# Patient Record
Sex: MALE | Race: BLACK OR AFRICAN AMERICAN | Employment: FULL TIME | URBAN - METROPOLITAN AREA
[De-identification: names, ages, dates, MRNs, and addresses within clinical notes are randomized per-mention and may not be internally consistent; named-entity substitution may affect disease eponyms.]

---

## 2017-04-29 ENCOUNTER — HOSPITAL ENCOUNTER (INPATIENT)
Age: 42
LOS: 5 days | Discharge: HOME | DRG: 392 | End: 2017-05-04
Attending: EMERGENCY MEDICINE | Admitting: INTERNAL MEDICINE
Payer: COMMERCIAL

## 2017-04-29 ENCOUNTER — APPOINTMENT (OUTPATIENT)
Dept: CT IMAGING | Age: 42
DRG: 392 | End: 2017-04-29
Attending: EMERGENCY MEDICINE
Payer: COMMERCIAL

## 2017-04-29 DIAGNOSIS — K52.9 ENTEROCOLITIS: Primary | ICD-10-CM

## 2017-04-29 LAB
ALBUMIN SERPL BCP-MCNC: 2.4 G/DL (ref 3.5–5)
ALBUMIN/GLOB SERPL: 0.5 {RATIO} (ref 1.1–2.2)
ALP SERPL-CCNC: 38 U/L (ref 45–117)
ALT SERPL-CCNC: 10 U/L (ref 12–78)
ANION GAP BLD CALC-SCNC: 8 MMOL/L (ref 5–15)
APPEARANCE UR: CLEAR
AST SERPL W P-5'-P-CCNC: 5 U/L (ref 15–37)
BACTERIA URNS QL MICRO: NEGATIVE /HPF
BASOPHILS # BLD AUTO: 0 K/UL (ref 0–0.1)
BASOPHILS # BLD: 0 % (ref 0–1)
BILIRUB SERPL-MCNC: 0.3 MG/DL (ref 0.2–1)
BILIRUB UR QL CFM: NEGATIVE
BUN SERPL-MCNC: 5 MG/DL (ref 6–20)
BUN/CREAT SERPL: 7 (ref 12–20)
CALCIUM SERPL-MCNC: 8.6 MG/DL (ref 8.5–10.1)
CHLORIDE SERPL-SCNC: 97 MMOL/L (ref 97–108)
CO2 SERPL-SCNC: 29 MMOL/L (ref 21–32)
COLOR UR: ABNORMAL
CREAT SERPL-MCNC: 0.71 MG/DL (ref 0.7–1.3)
CRP SERPL-MCNC: 13.9 MG/DL (ref 0–0.6)
DIFFERENTIAL METHOD BLD: ABNORMAL
EOSINOPHIL # BLD: 0 K/UL (ref 0–0.4)
EOSINOPHIL NFR BLD: 0 % (ref 0–7)
EPITH CASTS URNS QL MICRO: ABNORMAL /LPF
ERYTHROCYTE [DISTWIDTH] IN BLOOD BY AUTOMATED COUNT: 14.9 % (ref 11.5–14.5)
GLOBULIN SER CALC-MCNC: 5.1 G/DL (ref 2–4)
GLUCOSE SERPL-MCNC: 97 MG/DL (ref 65–100)
GLUCOSE UR STRIP.AUTO-MCNC: NEGATIVE MG/DL
HCT VFR BLD AUTO: 31.7 % (ref 36.6–50.3)
HGB BLD-MCNC: 10.6 G/DL (ref 12.1–17)
HGB UR QL STRIP: NEGATIVE
KETONES UR QL STRIP.AUTO: 40 MG/DL
LACTATE SERPL-SCNC: 0.7 MMOL/L (ref 0.4–2)
LEUKOCYTE ESTERASE UR QL STRIP.AUTO: NEGATIVE
LIPASE SERPL-CCNC: 47 U/L (ref 73–393)
LYMPHOCYTES # BLD AUTO: 20 % (ref 12–49)
LYMPHOCYTES # BLD: 1.7 K/UL (ref 0.8–3.5)
MCH RBC QN AUTO: 22.5 PG (ref 26–34)
MCHC RBC AUTO-ENTMCNC: 33.4 G/DL (ref 30–36.5)
MCV RBC AUTO: 67.2 FL (ref 80–99)
MONOCYTES # BLD: 1 K/UL (ref 0–1)
MONOCYTES NFR BLD AUTO: 12 % (ref 5–13)
NEUTS SEG # BLD: 5.6 K/UL (ref 1.8–8)
NEUTS SEG NFR BLD AUTO: 68 % (ref 32–75)
NITRITE UR QL STRIP.AUTO: NEGATIVE
PH UR STRIP: 6 [PH] (ref 5–8)
PLATELET # BLD AUTO: 878 K/UL (ref 150–400)
POTASSIUM SERPL-SCNC: 3.4 MMOL/L (ref 3.5–5.1)
PROT SERPL-MCNC: 7.5 G/DL (ref 6.4–8.2)
PROT UR STRIP-MCNC: 100 MG/DL
RBC # BLD AUTO: 4.72 M/UL (ref 4.1–5.7)
RBC #/AREA URNS HPF: ABNORMAL /HPF (ref 0–5)
RBC MORPH BLD: ABNORMAL
SODIUM SERPL-SCNC: 134 MMOL/L (ref 136–145)
SP GR UR REFRACTOMETRY: 1.03 (ref 1–1.03)
UA: UC IF INDICATED,UAUC: ABNORMAL
UROBILINOGEN UR QL STRIP.AUTO: 0.2 EU/DL (ref 0.2–1)
WBC # BLD AUTO: 8.3 K/UL (ref 4.1–11.1)
WBC URNS QL MICRO: ABNORMAL /HPF (ref 0–4)

## 2017-04-29 PROCEDURE — 87045 FECES CULTURE AEROBIC BACT: CPT | Performed by: INTERNAL MEDICINE

## 2017-04-29 PROCEDURE — 83690 ASSAY OF LIPASE: CPT | Performed by: EMERGENCY MEDICINE

## 2017-04-29 PROCEDURE — 74177 CT ABD & PELVIS W/CONTRAST: CPT

## 2017-04-29 PROCEDURE — 87086 URINE CULTURE/COLONY COUNT: CPT | Performed by: EMERGENCY MEDICINE

## 2017-04-29 PROCEDURE — 36415 COLL VENOUS BLD VENIPUNCTURE: CPT | Performed by: EMERGENCY MEDICINE

## 2017-04-29 PROCEDURE — 85025 COMPLETE CBC W/AUTO DIFF WBC: CPT | Performed by: EMERGENCY MEDICINE

## 2017-04-29 PROCEDURE — 87493 C DIFF AMPLIFIED PROBE: CPT | Performed by: INTERNAL MEDICINE

## 2017-04-29 PROCEDURE — 99284 EMERGENCY DEPT VISIT MOD MDM: CPT

## 2017-04-29 PROCEDURE — 96375 TX/PRO/DX INJ NEW DRUG ADDON: CPT

## 2017-04-29 PROCEDURE — 89055 LEUKOCYTE ASSESSMENT FECAL: CPT | Performed by: INTERNAL MEDICINE

## 2017-04-29 PROCEDURE — 83605 ASSAY OF LACTIC ACID: CPT | Performed by: EMERGENCY MEDICINE

## 2017-04-29 PROCEDURE — 96374 THER/PROPH/DIAG INJ IV PUSH: CPT

## 2017-04-29 PROCEDURE — 96376 TX/PRO/DX INJ SAME DRUG ADON: CPT

## 2017-04-29 PROCEDURE — 86140 C-REACTIVE PROTEIN: CPT | Performed by: EMERGENCY MEDICINE

## 2017-04-29 PROCEDURE — 81001 URINALYSIS AUTO W/SCOPE: CPT | Performed by: EMERGENCY MEDICINE

## 2017-04-29 PROCEDURE — 80053 COMPREHEN METABOLIC PANEL: CPT | Performed by: EMERGENCY MEDICINE

## 2017-04-29 PROCEDURE — 65270000029 HC RM PRIVATE

## 2017-04-29 PROCEDURE — 74011250636 HC RX REV CODE- 250/636: Performed by: EMERGENCY MEDICINE

## 2017-04-29 PROCEDURE — 74011000258 HC RX REV CODE- 258: Performed by: EMERGENCY MEDICINE

## 2017-04-29 PROCEDURE — 74011636320 HC RX REV CODE- 636/320: Performed by: EMERGENCY MEDICINE

## 2017-04-29 PROCEDURE — 74011250636 HC RX REV CODE- 250/636: Performed by: INTERNAL MEDICINE

## 2017-04-29 PROCEDURE — 74011250636 HC RX REV CODE- 250/636: Performed by: NURSE PRACTITIONER

## 2017-04-29 PROCEDURE — 74011000250 HC RX REV CODE- 250: Performed by: EMERGENCY MEDICINE

## 2017-04-29 RX ORDER — POTASSIUM CHLORIDE AND SODIUM CHLORIDE 900; 300 MG/100ML; MG/100ML
INJECTION, SOLUTION INTRAVENOUS CONTINUOUS
Status: DISCONTINUED | OUTPATIENT
Start: 2017-04-29 | End: 2017-04-30

## 2017-04-29 RX ORDER — ONDANSETRON 2 MG/ML
4 INJECTION INTRAMUSCULAR; INTRAVENOUS
Status: COMPLETED | OUTPATIENT
Start: 2017-04-29 | End: 2017-04-29

## 2017-04-29 RX ORDER — SODIUM CHLORIDE 9 MG/ML
150 INJECTION, SOLUTION INTRAVENOUS CONTINUOUS
Status: DISCONTINUED | OUTPATIENT
Start: 2017-04-29 | End: 2017-04-29

## 2017-04-29 RX ORDER — HYDROMORPHONE HYDROCHLORIDE 1 MG/ML
1 INJECTION, SOLUTION INTRAMUSCULAR; INTRAVENOUS; SUBCUTANEOUS
Status: DISCONTINUED | OUTPATIENT
Start: 2017-04-29 | End: 2017-04-30

## 2017-04-29 RX ORDER — METRONIDAZOLE 500 MG/100ML
500 INJECTION, SOLUTION INTRAVENOUS
Status: COMPLETED | OUTPATIENT
Start: 2017-04-29 | End: 2017-04-29

## 2017-04-29 RX ORDER — ACETAMINOPHEN 325 MG/1
650 TABLET ORAL
Status: DISCONTINUED | OUTPATIENT
Start: 2017-04-29 | End: 2017-05-04 | Stop reason: HOSPADM

## 2017-04-29 RX ORDER — METRONIDAZOLE 500 MG/1
500 TABLET ORAL 3 TIMES DAILY
COMMUNITY
End: 2017-05-04

## 2017-04-29 RX ORDER — ONDANSETRON 2 MG/ML
4 INJECTION INTRAMUSCULAR; INTRAVENOUS
Status: DISCONTINUED | OUTPATIENT
Start: 2017-04-29 | End: 2017-05-04 | Stop reason: HOSPADM

## 2017-04-29 RX ORDER — SODIUM CHLORIDE 0.9 % (FLUSH) 0.9 %
5-10 SYRINGE (ML) INJECTION EVERY 8 HOURS
Status: DISCONTINUED | OUTPATIENT
Start: 2017-04-29 | End: 2017-05-04 | Stop reason: HOSPADM

## 2017-04-29 RX ORDER — SODIUM CHLORIDE 0.9 % (FLUSH) 0.9 %
10 SYRINGE (ML) INJECTION
Status: COMPLETED | OUTPATIENT
Start: 2017-04-29 | End: 2017-04-29

## 2017-04-29 RX ORDER — HYDROMORPHONE HYDROCHLORIDE 1 MG/ML
1 INJECTION, SOLUTION INTRAMUSCULAR; INTRAVENOUS; SUBCUTANEOUS
Status: COMPLETED | OUTPATIENT
Start: 2017-04-29 | End: 2017-04-29

## 2017-04-29 RX ORDER — SODIUM CHLORIDE 0.9 % (FLUSH) 0.9 %
5-10 SYRINGE (ML) INJECTION AS NEEDED
Status: DISCONTINUED | OUTPATIENT
Start: 2017-04-29 | End: 2017-05-04 | Stop reason: HOSPADM

## 2017-04-29 RX ORDER — LEVOFLOXACIN 5 MG/ML
500 INJECTION, SOLUTION INTRAVENOUS EVERY 24 HOURS
Status: DISCONTINUED | OUTPATIENT
Start: 2017-04-29 | End: 2017-05-04

## 2017-04-29 RX ORDER — ENOXAPARIN SODIUM 100 MG/ML
40 INJECTION SUBCUTANEOUS EVERY 24 HOURS
Status: DISCONTINUED | OUTPATIENT
Start: 2017-04-29 | End: 2017-05-01

## 2017-04-29 RX ORDER — CIPROFLOXACIN 500 MG/1
500 TABLET ORAL 2 TIMES DAILY
COMMUNITY
End: 2017-05-04

## 2017-04-29 RX ORDER — METRONIDAZOLE 500 MG/100ML
500 INJECTION, SOLUTION INTRAVENOUS EVERY 8 HOURS
Status: DISCONTINUED | OUTPATIENT
Start: 2017-04-30 | End: 2017-05-04

## 2017-04-29 RX ORDER — LEVOFLOXACIN 5 MG/ML
500 INJECTION, SOLUTION INTRAVENOUS
Status: DISCONTINUED | OUTPATIENT
Start: 2017-04-29 | End: 2017-04-29 | Stop reason: SDUPTHER

## 2017-04-29 RX ADMIN — LEVOFLOXACIN 500 MG: 5 INJECTION, SOLUTION INTRAVENOUS at 19:24

## 2017-04-29 RX ADMIN — METRONIDAZOLE 500 MG: 500 INJECTION, SOLUTION INTRAVENOUS at 18:15

## 2017-04-29 RX ADMIN — ONDANSETRON 4 MG: 2 INJECTION INTRAMUSCULAR; INTRAVENOUS at 23:48

## 2017-04-29 RX ADMIN — HYDROMORPHONE HYDROCHLORIDE 1 MG: 1 INJECTION, SOLUTION INTRAMUSCULAR; INTRAVENOUS; SUBCUTANEOUS at 23:48

## 2017-04-29 RX ADMIN — SODIUM CHLORIDE AND POTASSIUM CHLORIDE: 9; 2.98 INJECTION, SOLUTION INTRAVENOUS at 19:57

## 2017-04-29 RX ADMIN — HYDROMORPHONE HYDROCHLORIDE 1 MG: 1 INJECTION, SOLUTION INTRAMUSCULAR; INTRAVENOUS; SUBCUTANEOUS at 17:22

## 2017-04-29 RX ADMIN — HYDROMORPHONE HYDROCHLORIDE 1 MG: 1 INJECTION, SOLUTION INTRAMUSCULAR; INTRAVENOUS; SUBCUTANEOUS at 19:25

## 2017-04-29 RX ADMIN — HYDROMORPHONE HYDROCHLORIDE 1 MG: 1 INJECTION, SOLUTION INTRAMUSCULAR; INTRAVENOUS; SUBCUTANEOUS at 16:12

## 2017-04-29 RX ADMIN — Medication 10 ML: at 17:10

## 2017-04-29 RX ADMIN — SODIUM CHLORIDE 100 ML: 900 INJECTION, SOLUTION INTRAVENOUS at 17:10

## 2017-04-29 RX ADMIN — SODIUM CHLORIDE 1000 ML: 900 INJECTION, SOLUTION INTRAVENOUS at 16:12

## 2017-04-29 RX ADMIN — ENOXAPARIN SODIUM 40 MG: 40 INJECTION SUBCUTANEOUS at 19:57

## 2017-04-29 RX ADMIN — IOPAMIDOL 100 ML: 755 INJECTION, SOLUTION INTRAVENOUS at 17:10

## 2017-04-29 RX ADMIN — ONDANSETRON 4 MG: 2 INJECTION INTRAMUSCULAR; INTRAVENOUS at 16:12

## 2017-04-29 NOTE — H&P
H and P dictated       40year old with abdominal pain and diarrhea and picture of enetrocolitis   Talked to Radiologist Dr Portia Maria , and according to him no SBO or perf seen but has sever dilatation of the bowels and thin colon walls at risk of perforation . Have discussed with Dr Valentino Jefferson and also put in for [facility] consult . started antibiotics and fluids for now /stool studies   I would wait to do any paracentesis yet for ascitis but would need it eventually   Patient adamant on eating food , but started on clears .

## 2017-04-29 NOTE — ROUTINE PROCESS
TRANSFER - IN REPORT:    Verbal report received from [facility]) on Rose Streeter  being received from ED(unit) for routine progression of care      Report consisted of patients Situation, Background, Assessment and   Recommendations(SBAR). Information from the following report(s) SBAR was reviewed with the receiving nurse. Opportunity for questions and clarification was provided. Assessment completed upon patients arrival to unit and care assumed.

## 2017-04-29 NOTE — ROUTINE PROCESS
TRANSFER - OUT REPORT:    Verbal report given to RN (name) on Myrtie Kayser  being transferred to 501 (unit) for routine progression of care       Report consisted of patients Situation, Background, Assessment and   Recommendations(SBAR). Information from the following report(s) SBAR, ED Summary, [facility] and Recent Results was reviewed with the receiving nurse. Lines:   Peripheral IV 04/29/17 Right Antecubital (Active)   Site Assessment Clean, dry, & intact 4/29/2017  4:01 PM   Phlebitis Assessment 0 4/29/2017  4:01 PM   Infiltration Assessment 0 4/29/2017  4:01 PM   Dressing Status Clean, dry, & intact 4/29/2017  4:01 PM   Hub Color/Line Status Pink 4/29/2017  4:01 PM        Opportunity for questions and clarification was provided.       Patient transported with:

## 2017-04-29 NOTE — ROUTINE PROCESS
Bedside and Verbal shift change report given to Shahnaz Jacques (oncoming nurse) by Reta Kang (offgoing nurse). Report included the following information SBAR, Kardex, ED Summary, Intake/Output, MAR, Accordion and Recent Results. All opportunity for clarification/questions given.

## 2017-04-29 NOTE — ED TRIAGE NOTES
Pt presents with mid to lower abd pain that has been going on for the past 6 weeks. Pt was recently in the hospital for same symptoms and was given IV antibiotics. Pt was then discharge on Flagyl and Cipro PO. Pt states he took his last dosages of antibiotics this past Thursday. Pt states his pain has increased and he has not been eating well. Pt cont to have diarrhea but denies vomiting.
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
## 2017-04-29 NOTE — IP AVS SNAPSHOT
[address] 
[phone] Patient: Tami Méndez MRN: [id number] CIU:8/90/3690 You are allergic to the following No active allergies Recent Documentation Height Weight BMI Smoking Status 1.829 m 68.6 kg 20.51 kg/m2 Current Every Day Smoker Emergency Contacts Name Discharge Info Relation Home Work Mobile Joss Caballero CAREGIVER [3] Friend [5] [phone] About your hospitalization You were admitted on:  April 29, 2017 You last received care in the:  [place] [id number] You were discharged on: May 4, 2017 Unit phone number:  [phone] Why you were hospitalized Your primary diagnosis was:  Not on File Your diagnoses also included:  Enterocolitis Providers Seen During Your Hospitalizations Provider Role Specialty Primary office phone Antony Barclay MD Attending Provider Emergency Medicine [phone] Zo Reeder MD Attending Provider Internal Medicine [phone] Mariangel Ayon DO Attending Provider Internal Medicine [phone] Nadja Guerra DO Attending Provider Internal Medicine [phone] Your Primary Care Physician (PCP) Primary Care Physician Office Phone Office Fax NONE ** None ** ** None ** Follow-up Information Follow up With Details Comments Contact Info None   None ([phone]) Patient stated that they have no PCP Destinee Brasher MD In 2 weeks  [address] [place] [address] [address] [place] [address] 
[phone] Current Discharge Medication List  
  
START taking these medications Dose & Instructions Dispensing Information Comments Morning Noon Evening Bedtime  
 mesalamine 500 mg CR capsule Commonly known as:  PENTASA Your last dose was:     
   
Your next dose is:    
   
   
 Dose:  1000 mg  
 Take 2 Caps by mouth three (3) times daily. Indications: CROHN'S DISEASE Quantity:  90 Cap Refills:  2  
     
   
   
   
  
 predniSONE 10 mg tablet Commonly known as:  Shanda Mueller Your last dose was: Your next dose is:    
   
   
 Dose:  60 mg Take 6 Tabs by mouth daily (with breakfast) for 7 days. Then 5 tablets daily for 7 days, then 4 tablets daily for 7 days, then 3 tablets daily for 7 days, then 2 tablets daily for 7 days, then 1 tablet daily for 7 days, then STOP Quantity:  147 Tab Refills:  0 STOP taking these medications CIPRO 500 mg tablet Generic drug:  ciprofloxacin HCl  
   
  
 metroNIDAZOLE 500 mg tablet Commonly known as:  FLAGYL Where to Get Your Medications Information on where to get these meds will be given to you by the nurse or doctor. ! Ask your nurse or doctor about these medications  
  mesalamine 500 mg CR capsule  
 predniSONE 10 mg tablet Discharge Instructions Colitis: Care Instructions Your Care Instructions Colitis is the medical term for swelling (inflammation) of the intestine. It can be caused by different things, such as an infection or loss of blood flow in the intestine. Other causes are problems like Crohn's disease or ulcerative colitis. Symptoms may include fever, diarrhea that may be bloody, or belly pain. Sometimes symptoms go away without treatment. But you may need treatment or more tests, such as blood tests or a stool test. Or you may need imaging tests like a CT scan or a colonoscopy. In some cases, the doctor may want to test a sample of tissue from the intestine. This test is called a biopsy. The doctor has checked you carefully, but problems can develop later. If you notice any problems or new symptoms, get medical treatment right away. Follow-up care is a key part of your treatment and safety.  Be sure to make and go to all appointments, and call your doctor if you are having problems. It's also a good idea to know your test results and keep a list of the medicines you take. How can you care for yourself at home? · Rest until you feel better. · Your doctor may recommend that you eat bland foods. These include rice, dry toast or crackers, bananas, and applesauce. · To prevent dehydration, drink plenty of fluids. Choose water and other caffeine-free clear liquids until you feel better. If you have kidney, heart, or liver disease and have to limit fluids, talk with your doctor before you increase the amount of fluids you drink. · Be safe with medicines. Take your medicines exactly as prescribed. Call your doctor if you think you are having a problem with your medicine. You will get more details on the specific medicines your doctor prescribes. When should you call for help? Call 911 anytime you think you may need emergency care. For example, call if: 
· You passed out (lost consciousness). · You vomit blood or what looks like coffee grounds. · Your stools are maroon or very bloody. Call your doctor now or seek immediate medical care if: 
· You have new or worse pain. · You have a new or higher fever. · You have new or worse symptoms. · You cannot keep fluids or medicines down. Watch closely for changes in your health, and be sure to contact your doctor if: 
· You do not get better as expected. Where can you learn more? Go to http://luis-timbo.info/. Kelly Cruz in the search box to learn more about \"Colitis: Care Instructions. \" Current as of: August 9, 2016 Content Version: 11.2 © 5359-6329 [employer]. Care instructions adapted under license by [employer] (which disclaims liability or warranty for this information).  If you have questions about a medical condition or this instruction, always ask your healthcare professional. [employer] disclaims any warranty or liability for your use of this information. Discharge Orders None Introducing [facility]! Pollo Roth introduces [facility] patient portal. Now you can access parts of your medical record, email your doctor's office, and request medication refills online. 1. In your internet browser, go to [email or web address] 2. Click on the First Time User? Click Here link in the Sign In box. You will see the New Member Sign Up page. 3. Enter your [facility] Access Code exactly as it appears below. You will not need to use this code after youve completed the sign-up process. If you do not sign up before the expiration date, you must request a new code. · [facility] Access Code: [email or web address] Expires: 7/28/2017  4:17 PM 
 
4. Enter the last four digits of your Social Security Number (xxxx) and Date of Birth (mm/dd/yyyy) as indicated and click Submit. You will be taken to the next sign-up page. 5. Create a [facility] ID. This will be your [facility] login ID and cannot be changed, so think of one that is secure and easy to remember. 6. Create a [facility] password. You can change your password at any time. 7. Enter your Password Reset Question and Answer. This can be used at a later time if you forget your password. 8. Enter your e-mail address. You will receive e-mail notification when new information is available in [address] 9. Click Sign Up. You can now view and download portions of your medical record. 10. Click the Download Summary menu link to download a portable copy of your medical information. If you have questions, please visit the Frequently Asked Questions section of the [facility] website. Remember, [facility] is NOT to be used for urgent needs. For medical emergencies, dial 911. Now available from your iPhone and Android! General Information Please provide this summary of care documentation to your next provider. Patient Signature:  ____________________________________________________________ Date:  ____________________________________________________________  
  
Mitzi Parisian Provider Signature:  ____________________________________________________________ Date:  ____________________________________________________________

## 2017-04-29 NOTE — ED NOTES
Shift change report given to [facility] (oncoming nurse) by Laura Covarrubias RN (offgoing nurse). Report included the following information SBAR, ED Summary, MAR and Recent Results.

## 2017-04-30 ENCOUNTER — APPOINTMENT (OUTPATIENT)
Dept: GENERAL RADIOLOGY | Age: 42
DRG: 392 | End: 2017-04-30
Attending: INTERNAL MEDICINE
Payer: COMMERCIAL

## 2017-04-30 LAB
ALBUMIN SERPL BCP-MCNC: 2.1 G/DL (ref 3.5–5)
ALBUMIN/GLOB SERPL: 0.5 {RATIO} (ref 1.1–2.2)
ALP SERPL-CCNC: 34 U/L (ref 45–117)
ALT SERPL-CCNC: 8 U/L (ref 12–78)
ANION GAP BLD CALC-SCNC: 8 MMOL/L (ref 5–15)
AST SERPL W P-5'-P-CCNC: 3 U/L (ref 15–37)
BILIRUB SERPL-MCNC: 0.2 MG/DL (ref 0.2–1)
BUN SERPL-MCNC: 5 MG/DL (ref 6–20)
BUN/CREAT SERPL: 8 (ref 12–20)
CALCIUM SERPL-MCNC: 8.3 MG/DL (ref 8.5–10.1)
CHLORIDE SERPL-SCNC: 99 MMOL/L (ref 97–108)
CO2 SERPL-SCNC: 28 MMOL/L (ref 21–32)
CREAT SERPL-MCNC: 0.6 MG/DL (ref 0.7–1.3)
GLOBULIN SER CALC-MCNC: 4.6 G/DL (ref 2–4)
GLUCOSE SERPL-MCNC: 111 MG/DL (ref 65–100)
POTASSIUM SERPL-SCNC: 4 MMOL/L (ref 3.5–5.1)
PROT SERPL-MCNC: 6.7 G/DL (ref 6.4–8.2)
SODIUM SERPL-SCNC: 135 MMOL/L (ref 136–145)

## 2017-04-30 PROCEDURE — 74022 RADEX COMPL AQT ABD SERIES: CPT

## 2017-04-30 PROCEDURE — 74011000250 HC RX REV CODE- 250: Performed by: INTERNAL MEDICINE

## 2017-04-30 PROCEDURE — 74011250636 HC RX REV CODE- 250/636: Performed by: INTERNAL MEDICINE

## 2017-04-30 PROCEDURE — 80053 COMPREHEN METABOLIC PANEL: CPT | Performed by: INTERNAL MEDICINE

## 2017-04-30 PROCEDURE — 65270000029 HC RM PRIVATE

## 2017-04-30 PROCEDURE — 36415 COLL VENOUS BLD VENIPUNCTURE: CPT | Performed by: INTERNAL MEDICINE

## 2017-04-30 RX ORDER — HYDROMORPHONE HYDROCHLORIDE 1 MG/ML
1 INJECTION, SOLUTION INTRAMUSCULAR; INTRAVENOUS; SUBCUTANEOUS
Status: DISCONTINUED | OUTPATIENT
Start: 2017-04-30 | End: 2017-05-01

## 2017-04-30 RX ORDER — MORPHINE SULFATE 2 MG/ML
1 INJECTION, SOLUTION INTRAMUSCULAR; INTRAVENOUS
Status: DISCONTINUED | OUTPATIENT
Start: 2017-04-30 | End: 2017-04-30

## 2017-04-30 RX ORDER — HYDROMORPHONE HYDROCHLORIDE 1 MG/ML
1 INJECTION, SOLUTION INTRAMUSCULAR; INTRAVENOUS; SUBCUTANEOUS ONCE
Status: COMPLETED | OUTPATIENT
Start: 2017-04-30 | End: 2017-04-30

## 2017-04-30 RX ORDER — HYDROMORPHONE HYDROCHLORIDE 1 MG/ML
1 INJECTION, SOLUTION INTRAMUSCULAR; INTRAVENOUS; SUBCUTANEOUS
Status: DISCONTINUED | OUTPATIENT
Start: 2017-04-30 | End: 2017-04-30

## 2017-04-30 RX ORDER — MORPHINE SULFATE 2 MG/ML
2 INJECTION, SOLUTION INTRAMUSCULAR; INTRAVENOUS
Status: DISCONTINUED | OUTPATIENT
Start: 2017-04-30 | End: 2017-04-30

## 2017-04-30 RX ORDER — SODIUM CHLORIDE 9 MG/ML
75 INJECTION, SOLUTION INTRAVENOUS CONTINUOUS
Status: DISCONTINUED | OUTPATIENT
Start: 2017-04-30 | End: 2017-05-04 | Stop reason: HOSPADM

## 2017-04-30 RX ORDER — HYDROCODONE BITARTRATE AND ACETAMINOPHEN 5; 325 MG/1; MG/1
1 TABLET ORAL
Status: DISCONTINUED | OUTPATIENT
Start: 2017-04-30 | End: 2017-05-04 | Stop reason: HOSPADM

## 2017-04-30 RX ADMIN — LEVOFLOXACIN 500 MG: 5 INJECTION, SOLUTION INTRAVENOUS at 19:37

## 2017-04-30 RX ADMIN — SODIUM CHLORIDE 125 ML/HR: 900 INJECTION, SOLUTION INTRAVENOUS at 18:40

## 2017-04-30 RX ADMIN — HYDROMORPHONE HYDROCHLORIDE 1 MG: 1 INJECTION, SOLUTION INTRAMUSCULAR; INTRAVENOUS; SUBCUTANEOUS at 22:06

## 2017-04-30 RX ADMIN — METRONIDAZOLE 500 MG: 500 INJECTION, SOLUTION INTRAVENOUS at 18:40

## 2017-04-30 RX ADMIN — Medication 1 MG: at 16:17

## 2017-04-30 RX ADMIN — Medication 10 ML: at 22:16

## 2017-04-30 RX ADMIN — HYDROMORPHONE HYDROCHLORIDE 1 MG: 1 INJECTION, SOLUTION INTRAMUSCULAR; INTRAVENOUS; SUBCUTANEOUS at 04:18

## 2017-04-30 RX ADMIN — POLYETHYLENE GLYCOL 3350, SODIUM SULFATE ANHYDROUS, SODIUM BICARBONATE, SODIUM CHLORIDE, POTASSIUM CHLORIDE 4000 ML: 236; 22.74; 6.74; 5.86; 2.97 POWDER, FOR SOLUTION ORAL at 16:50

## 2017-04-30 RX ADMIN — METRONIDAZOLE 500 MG: 500 INJECTION, SOLUTION INTRAVENOUS at 02:09

## 2017-04-30 RX ADMIN — HYDROMORPHONE HYDROCHLORIDE 1 MG: 1 INJECTION, SOLUTION INTRAMUSCULAR; INTRAVENOUS; SUBCUTANEOUS at 08:45

## 2017-04-30 RX ADMIN — SODIUM CHLORIDE AND POTASSIUM CHLORIDE: 9; 2.98 INJECTION, SOLUTION INTRAVENOUS at 08:48

## 2017-04-30 RX ADMIN — HYDROMORPHONE HYDROCHLORIDE 1 MG: 1 INJECTION, SOLUTION INTRAMUSCULAR; INTRAVENOUS; SUBCUTANEOUS at 19:42

## 2017-04-30 RX ADMIN — METRONIDAZOLE 500 MG: 500 INJECTION, SOLUTION INTRAVENOUS at 10:23

## 2017-04-30 NOTE — PROGRESS NOTES
Pt got dose of morphine iv at 1617 pm, now wants pain med changed back to dilaudid, page out to Dr Amadou Mendoza.

## 2017-04-30 NOTE — PROGRESS NOTES
Hospitalist Progress Note  Mellissa Rodriguez [place]  Office: [phone]        Date of Service:  2017  NAME:  Stevo Sotomayor  :  1975  MRN:  [id number]      Admission Summary:   41 y/o male admitted for abdominal pain. CT abdomen/pelvis with infectious vs inflammatory colitis. Interval history / Subjective:   Positive for vomiting this AM.  No prior vomiting until arrival to the hospital.  Positive abdominal pain. No BM yet today. Assessment & Plan:     41 y/o male with:  1. Acute colitis  --Infectious vs inflammatory  --Continue levoquin/flagyl  --Appreciate GI consult-->colonoscopy in AM  --Appreciate surgery consult-->no current indication for surgical intervention    2. Nausea with vomiting  --Acute abdominal series pending to eval for obstruction  --If positive, will need NG to suction and surgical re-evaluation  --Zofran and compazine PRN    3. Mild hyponatremia  --IVF    4. Hypokalemia  --Resolved     Code status: FULL  DVT prophylaxis: Lovenox    Care Plan discussed with: patient and nurse  Disposition: TBD     Hospital Problems  Never Reviewed          Codes Class Noted POA    Enterocolitis ICD-10-CM: K52.9  ICD-9-CM: 558.9  2017 Unknown                Review of Systems:   See above      Vital Signs:    Last 24hrs VS reviewed since prior progress note.  Most recent are:  Visit Vitals    /84 (BP 1 Location: Left arm, BP Patient Position: At rest;Head of bed elevated (Comment degrees))    Pulse 72    Temp 98.4 °F (36.9 °C)    Resp 14    Ht 6' (1.829 m)    SpO2 97%         Intake/Output Summary (Last 24 hours) at 17 1454  Last data filed at 17 0313   Gross per 24 hour   Intake                0 ml   Output             1650 ml   Net            -1650 ml        Physical Examination:             Constitutional:  Mild acute distress--actively vomiting, cooperative, pleasant    ENT:  Oral mucous moist, oropharynx benign. Neck supple,    Resp:  CTA bilaterally. No wheezing/rhonchi/rales. No accessory muscle use   CV:  Regular rhythm, normal rate, no murmurs, gallops, rubs    GI:  Firm and distended, non tender. Hypo=oactive bowel sounds, no hepatosplenomegaly     Musculoskeletal:  No edema, warm, 2+ pulses throughout    Neurologic:  Moves all extremities. AAOx3, CN II-XII reviewed           Labs:     Recent Labs      04/29/17   1602   WBC  8.3   HGB  10.6*   HCT  31.7*   PLT  878*     Recent Labs      04/30/17   0406  04/29/17   1602   NA  135*  134*   K  4.0  3.4*   CL  99  97   CO2  28  29   BUN  5*  5*   CREA  0.60*  0.71   GLU  111*  97   CA  8.3*  8.6     Recent Labs      04/30/17   0406  04/29/17   1602   SGOT  3*  5*   ALT  8*  10*   AP  34*  38*   TBILI  0.2  0.3   TP  6.7  7.5   ALB  2.1*  2.4*   GLOB  4.6*  5.1*   LPSE   --   47*     No results for input(s): INR, PTP, APTT in the last 72 hours. No lab exists for component: INREXT   No results for input(s): FE, TIBC, PSAT, FERR in the last 72 hours. No results found for: FOL, RBCF   No results for input(s): PH, PCO2, PO2 in the last 72 hours. No results for input(s): CPK, CKNDX, TROIQ in the last 72 hours.     No lab exists for component: CPKMB  No results found for: CHOL, CHOLX, CHLST, CHOLV, HDL, LDL, DLDL, LDLC, DLDLP, TGL, TGLX, TRIGL, TRIGP, CHHD, CHHDX  No results found for: [facility]  Lab Results   Component Value Date/Time    Color DARK YELLOW 04/29/2017 04:15 PM    Appearance CLEAR 04/29/2017 04:15 PM    Specific gravity 1.027 04/29/2017 04:15 PM    pH (UA) 6.0 04/29/2017 04:15 PM    Protein 100 04/29/2017 04:15 PM    Glucose NEGATIVE  04/29/2017 04:15 PM    Ketone 40 04/29/2017 04:15 PM    Urobilinogen 0.2 04/29/2017 04:15 PM    Nitrites NEGATIVE  04/29/2017 04:15 PM    Leukocyte Esterase NEGATIVE  04/29/2017 04:15 PM    Epithelial cells FEW 04/29/2017 04:15 PM    Bacteria NEGATIVE  04/29/2017 04:15 PM    WBC 10-20 04/29/2017 04:15 PM RBC 0-5 04/29/2017 04:15 PM         Medications Reviewed:     Current Facility-Administered Medications   Medication Dose Route Frequency    HYDROcodone-acetaminophen (NORCO) 5-325 mg per tablet 1 Tab  1 Tab Oral Q6H PRN    peg 3350-electrolytes (COLYTE) 4000 mL  4,000 mL Oral ONCE    morphine injection 1 mg  1 mg IntraVENous Q4H PRN    prochlorperazine (COMPAZINE) with saline injection 5 mg  5 mg IntraVENous Q8H PRN    metroNIDAZOLE (FLAGYL) IVPB premix 500 mg  500 mg IntraVENous Q8H    levoFLOXacin (LEVAQUIN) 500 mg in D5W IVPB  500 mg IntraVENous Q24H    sodium chloride (NS) flush 5-10 mL  5-10 mL IntraVENous Q8H    sodium chloride (NS) flush 5-10 mL  5-10 mL IntraVENous PRN    acetaminophen (TYLENOL) tablet 650 mg  650 mg Oral Q4H PRN    enoxaparin (LOVENOX) injection 40 mg  40 mg SubCUTAneous Q24H    0.9% sodium chloride with KCl 40 mEq/L infusion   IntraVENous CONTINUOUS    ondansetron (ZOFRAN) injection 4 mg  4 mg IntraVENous Q4H PRN                    Mellissa Rodriguez DO

## 2017-04-30 NOTE — ROUTINE PROCESS
Bedside shift change report given to ROSEANNE Christie (oncoming nurse) by [facility], RN (offgoing nurse). Report included the following information SBAR.

## 2017-04-30 NOTE — PROGRESS NOTES
Pt with many complaints about having to drink golytely for bowel prep,. Instructed pt on drinking golytely, time frame to complete it, nothing to eat or drink after midnight, etc. Pt states he \"will do what he can do\". Liquid stool specimen sent to lab for C.diff, culture and WBC.
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
## 2017-04-30 NOTE — PROGRESS NOTES
[facility] returned page. Notified MD patient is unhappy with pain medication change. Md gave telephone read back order for Morphine 2mg IV Q4H PRN for pain. [phone] Patient requesting to be put back on dilaudid for pain. Paged MD to notify. 200 Dr. Noemi Mcmanus returned page and gave order for Dilaudid 1mg IV Q4H for pain.

## 2017-05-01 ENCOUNTER — ANESTHESIA EVENT (OUTPATIENT)
Dept: ENDOSCOPY | Age: 42
DRG: 392 | End: 2017-05-01
Payer: COMMERCIAL

## 2017-05-01 ENCOUNTER — ANESTHESIA (OUTPATIENT)
Dept: ENDOSCOPY | Age: 42
DRG: 392 | End: 2017-05-01
Payer: COMMERCIAL

## 2017-05-01 LAB
ANION GAP BLD CALC-SCNC: 9 MMOL/L (ref 5–15)
BACTERIA SPEC CULT: NORMAL
BUN SERPL-MCNC: 3 MG/DL (ref 6–20)
BUN/CREAT SERPL: 5 (ref 12–20)
C DIFF TOX GENS STL QL NAA+PROBE: NEGATIVE
CALCIUM SERPL-MCNC: 8.5 MG/DL (ref 8.5–10.1)
CC UR VC: NORMAL
CHLORIDE SERPL-SCNC: 100 MMOL/L (ref 97–108)
CO2 SERPL-SCNC: 28 MMOL/L (ref 21–32)
CREAT SERPL-MCNC: 0.62 MG/DL (ref 0.7–1.3)
ERYTHROCYTE [DISTWIDTH] IN BLOOD BY AUTOMATED COUNT: 15.1 % (ref 11.5–14.5)
GLUCOSE SERPL-MCNC: 110 MG/DL (ref 65–100)
HCT VFR BLD AUTO: 31.7 % (ref 36.6–50.3)
HGB BLD-MCNC: 10.4 G/DL (ref 12.1–17)
MAGNESIUM SERPL-MCNC: 2 MG/DL (ref 1.6–2.4)
MCH RBC QN AUTO: 22.3 PG (ref 26–34)
MCHC RBC AUTO-ENTMCNC: 32.8 G/DL (ref 30–36.5)
MCV RBC AUTO: 68 FL (ref 80–99)
PLATELET # BLD AUTO: 837 K/UL (ref 150–400)
POTASSIUM SERPL-SCNC: 3.8 MMOL/L (ref 3.5–5.1)
RBC # BLD AUTO: 4.66 M/UL (ref 4.1–5.7)
SERVICE CMNT-IMP: NORMAL
SODIUM SERPL-SCNC: 137 MMOL/L (ref 136–145)
WBC # BLD AUTO: 8.1 K/UL (ref 4.1–11.1)
WBC #/AREA STL HPF: >20 /HPF (ref 0–4)

## 2017-05-01 PROCEDURE — 74011000250 HC RX REV CODE- 250: Performed by: INTERNAL MEDICINE

## 2017-05-01 PROCEDURE — 74011250637 HC RX REV CODE- 250/637: Performed by: INTERNAL MEDICINE

## 2017-05-01 PROCEDURE — 74011250636 HC RX REV CODE- 250/636: Performed by: INTERNAL MEDICINE

## 2017-05-01 PROCEDURE — 80048 BASIC METABOLIC PNL TOTAL CA: CPT | Performed by: INTERNAL MEDICINE

## 2017-05-01 PROCEDURE — 85027 COMPLETE CBC AUTOMATED: CPT | Performed by: INTERNAL MEDICINE

## 2017-05-01 PROCEDURE — 65270000029 HC RM PRIVATE

## 2017-05-01 PROCEDURE — 83735 ASSAY OF MAGNESIUM: CPT | Performed by: INTERNAL MEDICINE

## 2017-05-01 PROCEDURE — 36415 COLL VENOUS BLD VENIPUNCTURE: CPT | Performed by: INTERNAL MEDICINE

## 2017-05-01 RX ORDER — HYDROMORPHONE HYDROCHLORIDE 1 MG/ML
1 INJECTION, SOLUTION INTRAMUSCULAR; INTRAVENOUS; SUBCUTANEOUS
Status: DISCONTINUED | OUTPATIENT
Start: 2017-05-01 | End: 2017-05-04 | Stop reason: HOSPADM

## 2017-05-01 RX ADMIN — HYDROMORPHONE HYDROCHLORIDE 1 MG: 1 INJECTION, SOLUTION INTRAMUSCULAR; INTRAVENOUS; SUBCUTANEOUS at 08:23

## 2017-05-01 RX ADMIN — Medication 10 ML: at 07:48

## 2017-05-01 RX ADMIN — HYDROMORPHONE HYDROCHLORIDE 1 MG: 1 INJECTION, SOLUTION INTRAMUSCULAR; INTRAVENOUS; SUBCUTANEOUS at 04:24

## 2017-05-01 RX ADMIN — SODIUM CHLORIDE 125 ML/HR: 900 INJECTION, SOLUTION INTRAVENOUS at 04:47

## 2017-05-01 RX ADMIN — LEVOFLOXACIN 500 MG: 5 INJECTION, SOLUTION INTRAVENOUS at 19:22

## 2017-05-01 RX ADMIN — POLYETHYLENE GLYCOL 3350, SODIUM SULFATE ANHYDROUS, SODIUM BICARBONATE, SODIUM CHLORIDE, POTASSIUM CHLORIDE 4000 ML: 236; 22.74; 6.74; 5.86; 2.97 POWDER, FOR SOLUTION ORAL at 17:04

## 2017-05-01 RX ADMIN — METRONIDAZOLE 500 MG: 500 INJECTION, SOLUTION INTRAVENOUS at 09:45

## 2017-05-01 RX ADMIN — HYDROMORPHONE HYDROCHLORIDE 1 MG: 1 INJECTION, SOLUTION INTRAMUSCULAR; INTRAVENOUS; SUBCUTANEOUS at 22:23

## 2017-05-01 RX ADMIN — METRONIDAZOLE 500 MG: 500 INJECTION, SOLUTION INTRAVENOUS at 01:38

## 2017-05-01 RX ADMIN — HYDROMORPHONE HYDROCHLORIDE 1 MG: 1 INJECTION, SOLUTION INTRAMUSCULAR; INTRAVENOUS; SUBCUTANEOUS at 01:19

## 2017-05-01 RX ADMIN — METRONIDAZOLE 500 MG: 500 INJECTION, SOLUTION INTRAVENOUS at 17:04

## 2017-05-01 RX ADMIN — HYDROMORPHONE HYDROCHLORIDE 1 MG: 1 INJECTION, SOLUTION INTRAMUSCULAR; INTRAVENOUS; SUBCUTANEOUS at 13:09

## 2017-05-01 NOTE — PROGRESS NOTES
Patient rating pain at 100. In bed grimacing and restlessness. Requesting for more frequent pain medication. Hospitalist on call  Tien Larose MD) was paged and gave orders for a one time dose of Dilaudid 1mg IV now and to increase frequency from q4hr to q3hr. 2300  Patient refusing to consume remaining Golytely at this. States he needs to get his abdominal pain under control. Will continue to medicate as needed and encourage bowel prep.    0200 Patient continue to refuse bowel prep at this time. States \"I can't keep that stuff down, it's awful. \" Patient has vomited thin green emesis (700ml). Denies Nausea. Refuse to be medicated with antiemetic or have crystal light add to prep. States \" I can't do this, I'm going to have to do this another time. \" Educated patient on the importance of bowel prep and he continue to refuse. 0730 Patient had another episode of vomiting green thin emesis (750ml). Continue to deny nausea. Continue to refuse any antiemetics to be given.

## 2017-05-01 NOTE — PROGRESS NOTES
Subjective  Continues to complain of pain. Having some difficulty with the bowel prep      Temp:  [97.6 °F (36.4 °C)-99.4 °F (37.4 °C)]   Pulse (Heart Rate):  []   BP: (109-131)/(68-88)   Resp Rate:  [14-18]   O2 Sat (%):  [94 %-99 %]   Weight: --     04/29 0701 - 04/30 1900  In: 900 [P.O.:900]  Out: 2250 [Urine:700]      Objective  Gen- Alert in NAD  Lungs- CTA  H- RRR  Abd- soft- still distended with some tenderness no rebound or guarding. AXR- Bowel distension and enterocolitis    Active Problems:    Enterocolitis (4/29/2017)          Assessment & Plan  Plan is for colonoscopy tomorrow with GI  Will continue to follow. If nausea and vomiting develops may need NGT.

## 2017-05-01 NOTE — PROGRESS NOTES
[address] [facility] [address] [place] [address] [facility]                GI PROGRESS NOTE        NAME:   Julia Hernandez       :    1975       MRN:    [id number]     Assessment/Plan   1. Colitis: plan for colon tomorrow. Stool studies pending. He has lost 15 lbs in last 3 months but denies fever. 2. Ascites: US guided paracentesis tomorrow. 3. Abdominal pain: He has been to [facility] multiple times but left AMA. Patient Active Problem List   Diagnosis Code    Enterocolitis K52.9       Subjective:     Julia Hernandez is a 40 y.o.  male who was admitted with abdominal pain , diarrhea and weight loss     Review of Systems    Constitutional: negative fever, negative chills, positive weight loss  Eyes:   negative visual changes  ENT:   negative sore throat, tongue or lip swelling  Respiratory:  negative cough, negative dyspnea  Cards:  negative for chest pain, palpitations, lower extremity edema  GI:   See HPI  :  negative for frequency, dysuria  Integument:  negative for rash and pruritus  Heme:  negative for easy bruising and gum/nose bleeding  Musculoskel: negative for myalgias,  back pain and muscle weakness  Neuro: negative for headaches, dizziness, vertigo  Psych:  negative for feelings of anxiety, depression           Objective:     VITALS:   Last 24hrs VS reviewed since prior hospitalist progress note.  Most recent are:  Visit Vitals    /73 (BP 1 Location: Left arm, BP Patient Position: At rest)    Pulse 79    Temp 98.7 °F (37.1 °C)    Resp 16    Ht 6' (1.829 m)    SpO2 96%       Intake/Output Summary (Last 24 hours) at 17 1842  Last data filed at 17 0448   Gross per 24 hour   Intake                0 ml   Output              175 ml   Net             -175 ml        PHYSICAL EXAM:  General   Thin, appears stated age, in no acute distress  EENT  Normocephalic, Atraumatic, PERRLA, EOMI, sclera clear, nares clear, pharynx normal  Neck   Supple without nodes or mass. No thyromegaly or bruit  Respiratory   Clear To Auscultation bilaterally - no wheezes, rales, rhonchi, or crackles  Cardiology  Regular Rate and Rythmn  - no murmurs, rubs or gallops  Abdominal  Soft, non-tender, non-distended, positive bowel sounds, no hepatosplenomegaly, no palpable mass, ascites present. Extremities  No clubbing, cyanosis, or edema. Pulses intact. Skin  Normal skin turgor. No rashes or skin ulcers noted  Neurological  No focal neurological deficits noted  Psychological  Oriented x 3. Normal affect.        Lab Data:reviewed      Medications Reviewed    PMH/ reviewed - no change compared to H&P  Attending Physician: Rama Parker MD   Date/Time:  5/1/2017    ________________________________________________________________________

## 2017-05-01 NOTE — PROGRESS NOTES
General Surgery Daily Progress Note    Admit Date: 2017  Post-Operative Day: * No surgery date entered * from Procedure(s):  COLONOSCOPY     Subjective:     Last 24 hrs: Pt sleeping deeply & unable to awake. Talked with nurse regarding his disposition. Pt c/o intermittent cramping abdominal pain. Unable to tolerate Golytely last night because it made him vomit, therefore colonoscopy never performed this morning. Will try again this afternoon. +BM today. Objective:     Blood pressure 115/77, pulse 72, temperature 98.1 °F (36.7 °C), resp. rate 16, height 6' (1.829 m), SpO2 97 %. Temp (24hrs), Av.5 °F (36.9 °C), Min:97.7 °F (36.5 °C), Max:99.4 °F (37.4 °C)      _____________________  Physical Exam:     Alert and Oriented, sleeping, in no acute distress. Cardiovascular: no peripheral edema  Lungs:no respiratory distress      Assessment:   Active Problems:    Enterocolitis (2017)            Plan: To have colonoscopy later this afternoon. Continue antibiotics. Continue pain management.  OOB.   Further treatment per medicine team.    Surgery Attending  As per above note from PA  Awaiting GI prep for colonoscopy in order to determine dx and next steps  Kia Del Cid MD      Data Review:    Recent Labs      17   0434  17   1602   WBC  8.1  8.3   HGB  10.4*  10.6*   HCT  31.7*  31.7*   PLT  837*  878*     Recent Labs      17   0434  17   0406  17   1602   NA  137  135*  134*   K  3.8  4.0  3.4*   CL  100  99  97   CO2  28  28  29   GLU  110*  111*  97   BUN  3*  5*  5*   CREA  0.62*  0.60*  0.71   CA  8.5  8.3*  8.6   MG  2.0   --    --    ALB   --   2.1*  2.4*   SGOT   --   3*  5*   ALT   --   8*  10*     Recent Labs      17   1602   LPSE  47*           ______________________  Medications:    Current Facility-Administered Medications   Medication Dose Route Frequency    HYDROmorphone (PF) (DILAUDID) injection 1 mg  1 mg IntraVENous Q2H PRN    peg 3350-electrolytes (COLYTE) 4000 mL  4,000 mL Oral ONCE    HYDROcodone-acetaminophen (NORCO) 5-325 mg per tablet 1 Tab  1 Tab Oral Q6H PRN    prochlorperazine (COMPAZINE) with saline injection 5 mg  5 mg IntraVENous Q8H PRN    0.9% sodium chloride infusion  75 mL/hr IntraVENous CONTINUOUS    metroNIDAZOLE (FLAGYL) IVPB premix 500 mg  500 mg IntraVENous Q8H    levoFLOXacin (LEVAQUIN) 500 mg in D5W IVPB  500 mg IntraVENous Q24H    sodium chloride (NS) flush 5-10 mL  5-10 mL IntraVENous Q8H    sodium chloride (NS) flush 5-10 mL  5-10 mL IntraVENous PRN    acetaminophen (TYLENOL) tablet 650 mg  650 mg Oral Q4H PRN    enoxaparin (LOVENOX) injection 40 mg  40 mg SubCUTAneous Q24H    ondansetron (ZOFRAN) injection 4 mg  4 mg IntraVENous Q4H PRN       Lyubov Lagunas PA-C  5/1/2017

## 2017-05-01 NOTE — PROGRESS NOTES
Hospitalist Progress Note  [place], [place]  Office: [phone]      Date of Service:  2017  NAME:  Rosaura Garner  :  1975  MRN:  [id number]      Admission Summary:   39 y/o male admitted for abdominal pain. CT abdomen/pelvis with infectious vs inflammatory colitis.       Interval history / Subjective:   No vomiting yesterday from noon until this AM.  Positive emesis this morning. Still with abdominal pain--unchanged. Currently rated 6/10. Improved with IV dilaudid. Did not drink colon prep overnight as could not tolerate. Assessment & Plan:     39 y/o male with:  1. Acute colitis  --Infectious vs inflammatory  --Continue levoquin/flagyl  --Appreciate GI consult-->was to have colonoscopy today, however, was not able to complete colon prep. Will discuss with GI. Assume can have CLD today with colon prep again tonight. --Appreciate surgery consult     2. Nausea with vomiting  --Acute abdominal series with persistent small bowel distention secondary to enteritis vs bowel obstruction    --Patient with BM x 1 yesterday and today  --As patient having bowel movements, do not feel NG tube is absolutely necessary, though do feel he would benefit from NG decompression-->patient refusing NG   --Zofran and compazine PRN     3. Mild hyponatremia  --Resolved      4. Hypokalemia  --Resolved      Code status: FULL  DVT prophylaxis: Lovenox     Care Plan discussed with: patient and nurse; awaiting GI discussion  Disposition: TBD     Hospital Problems  Never Reviewed          Codes Class Noted POA    Enterocolitis ICD-10-CM: K52.9  ICD-9-CM: 558.9  2017 Unknown                Review of Systems:   See above      Vital Signs:    Last 24hrs VS reviewed since prior progress note.  Most recent are:  Visit Vitals    /77    Pulse 72    Temp 98.1 °F (36.7 °C)    Resp 16    Ht 6' (1.829 m)    SpO2 97%         Intake/Output Summary (Last 24 hours) at 05/01/17 1114  Last data filed at 05/01/17 0448   Gross per 24 hour   Intake              780 ml   Output              475 ml   Net              305 ml        Physical Examination:             Constitutional:  No acute distress, cooperative, pleasant    ENT:  Oral mucous moist, oropharynx benign. Neck supple,    Resp:  CTA bilaterally. No wheezing/rhonchi/rales. No accessory muscle use   CV:  Regular rhythm, normal rate, no murmurs, S1 S2 normal    GI:  Less tender to palpation today than yesterday. Distended. Hypoactive bowel sounds, no hepatosplenomegaly     Musculoskeletal:  No edema, warm, 2+ pulses throughout    Neurologic:  Moves all extremities. AAOx3, CN II-XII reviewed           Labs:     Recent Labs      05/01/17   0434 04/29/17   1602   WBC  8.1  8.3   HGB  10.4*  10.6*   HCT  31.7*  31.7*   PLT  837*  878*     Recent Labs      05/01/17   0434  04/30/17   0406  04/29/17   1602   NA  137  135*  134*   K  3.8  4.0  3.4*   CL  100  99  97   CO2  28  28  29   BUN  3*  5*  5*   CREA  0.62*  0.60*  0.71   GLU  110*  111*  97   CA  8.5  8.3*  8.6   MG  2.0   --    --      Recent Labs      04/30/17   0406  04/29/17   1602   SGOT  3*  5*   ALT  8*  10*   AP  34*  38*   TBILI  0.2  0.3   TP  6.7  7.5   ALB  2.1*  2.4*   GLOB  4.6*  5.1*   LPSE   --   47*     No results for input(s): INR, PTP, APTT in the last 72 hours. No lab exists for component: INREXT   No results for input(s): FE, TIBC, PSAT, FERR in the last 72 hours. No results found for: FOL, RBCF   No results for input(s): PH, PCO2, PO2 in the last 72 hours. No results for input(s): CPK, CKNDX, TROIQ in the last 72 hours.     No lab exists for component: CPKMB  No results found for: CHOL, CHOLX, CHLST, CHOLV, HDL, LDL, DLDL, LDLC, DLDLP, TGL, TGLX, TRIGL, TRIGP, CHHD, CHHDX  No results found for: [facility]  Lab Results   Component Value Date/Time    Color DARK YELLOW 04/29/2017 04:15 PM    Appearance CLEAR 04/29/2017 04:15 PM Specific gravity 1.027 04/29/2017 04:15 PM    pH (UA) 6.0 04/29/2017 04:15 PM    Protein 100 04/29/2017 04:15 PM    Glucose NEGATIVE  04/29/2017 04:15 PM    Ketone 40 04/29/2017 04:15 PM    Urobilinogen 0.2 04/29/2017 04:15 PM    Nitrites NEGATIVE  04/29/2017 04:15 PM    Leukocyte Esterase NEGATIVE  04/29/2017 04:15 PM    Epithelial cells FEW 04/29/2017 04:15 PM    Bacteria NEGATIVE  04/29/2017 04:15 PM    WBC 10-20 04/29/2017 04:15 PM    RBC 0-5 04/29/2017 04:15 PM         Medications Reviewed:     Current Facility-Administered Medications   Medication Dose Route Frequency    HYDROmorphone (PF) (DILAUDID) injection 1 mg  1 mg IntraVENous Q2H PRN    HYDROcodone-acetaminophen (NORCO) 5-325 mg per tablet 1 Tab  1 Tab Oral Q6H PRN    prochlorperazine (COMPAZINE) with saline injection 5 mg  5 mg IntraVENous Q8H PRN    0.9% sodium chloride infusion  125 mL/hr IntraVENous CONTINUOUS    metroNIDAZOLE (FLAGYL) IVPB premix 500 mg  500 mg IntraVENous Q8H    levoFLOXacin (LEVAQUIN) 500 mg in D5W IVPB  500 mg IntraVENous Q24H    sodium chloride (NS) flush 5-10 mL  5-10 mL IntraVENous Q8H    sodium chloride (NS) flush 5-10 mL  5-10 mL IntraVENous PRN    acetaminophen (TYLENOL) tablet 650 mg  650 mg Oral Q4H PRN    enoxaparin (LOVENOX) injection 40 mg  40 mg SubCUTAneous Q24H    ondansetron (ZOFRAN) injection 4 mg  4 mg IntraVENous Q4H PRN     ______________________________________________________________________  EXPECTED LENGTH OF STAY: 2d 16h  ACTUAL LENGTH OF STAY:          [facility] Michael Isidro DO
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
## 2017-05-01 NOTE — PROGRESS NOTES
Bedside shift change report given to Maida Mccarthy(oncoming nurse) by Rosalba Inman RN (offgoing nurse). Report included the following information SBAR, Kardex, Intake/Output, MAR, Accordion and Recent Results.

## 2017-05-02 ENCOUNTER — APPOINTMENT (OUTPATIENT)
Dept: ULTRASOUND IMAGING | Age: 42
DRG: 392 | End: 2017-05-02
Attending: SPECIALIST
Payer: COMMERCIAL

## 2017-05-02 ENCOUNTER — APPOINTMENT (OUTPATIENT)
Dept: GENERAL RADIOLOGY | Age: 42
DRG: 392 | End: 2017-05-02
Attending: SPECIALIST
Payer: COMMERCIAL

## 2017-05-02 LAB
ANION GAP BLD CALC-SCNC: 6 MMOL/L (ref 5–15)
BACTERIA SPEC CULT: NORMAL
BUN SERPL-MCNC: 2 MG/DL (ref 6–20)
BUN/CREAT SERPL: 3 (ref 12–20)
C JEJUNI+C COLI AG STL QL: NEGATIVE
CALCIUM SERPL-MCNC: 8.3 MG/DL (ref 8.5–10.1)
CHLORIDE SERPL-SCNC: 101 MMOL/L (ref 97–108)
CO2 SERPL-SCNC: 27 MMOL/L (ref 21–32)
CREAT SERPL-MCNC: 0.63 MG/DL (ref 0.7–1.3)
E COLI SXT1+2 STL IA: NEGATIVE
ERYTHROCYTE [DISTWIDTH] IN BLOOD BY AUTOMATED COUNT: 15 % (ref 11.5–14.5)
GLUCOSE SERPL-MCNC: 90 MG/DL (ref 65–100)
HCT VFR BLD AUTO: 30.3 % (ref 36.6–50.3)
HGB BLD-MCNC: 9.7 G/DL (ref 12.1–17)
INR PPP: 1.2 (ref 0.9–1.1)
MCH RBC QN AUTO: 21.7 PG (ref 26–34)
MCHC RBC AUTO-ENTMCNC: 32 G/DL (ref 30–36.5)
MCV RBC AUTO: 67.6 FL (ref 80–99)
PLATELET # BLD AUTO: 870 K/UL (ref 150–400)
POTASSIUM SERPL-SCNC: 3.7 MMOL/L (ref 3.5–5.1)
PROTHROMBIN TIME: 11.9 SEC (ref 9–11.1)
RBC # BLD AUTO: 4.48 M/UL (ref 4.1–5.7)
SERVICE CMNT-IMP: NORMAL
SODIUM SERPL-SCNC: 134 MMOL/L (ref 136–145)
WBC # BLD AUTO: 6.9 K/UL (ref 4.1–11.1)

## 2017-05-02 PROCEDURE — 0DBF8ZX EXCISION OF RIGHT LARGE INTESTINE, VIA NATURAL OR ARTIFICIAL OPENING ENDOSCOPIC, DIAGNOSTIC: ICD-10-PCS | Performed by: SPECIALIST

## 2017-05-02 PROCEDURE — 77030027957 HC TBNG IRR ENDOGTR BUSS -B: Performed by: SPECIALIST

## 2017-05-02 PROCEDURE — 76705 ECHO EXAM OF ABDOMEN: CPT

## 2017-05-02 PROCEDURE — 74011250636 HC RX REV CODE- 250/636

## 2017-05-02 PROCEDURE — 88305 TISSUE EXAM BY PATHOLOGIST: CPT | Performed by: SPECIALIST

## 2017-05-02 PROCEDURE — 77030009426 HC FCPS BIOP ENDOSC BSC -B: Performed by: SPECIALIST

## 2017-05-02 PROCEDURE — 65270000029 HC RM PRIVATE

## 2017-05-02 PROCEDURE — 0DBK8ZX EXCISION OF ASCENDING COLON, VIA NATURAL OR ARTIFICIAL OPENING ENDOSCOPIC, DIAGNOSTIC: ICD-10-PCS | Performed by: SPECIALIST

## 2017-05-02 PROCEDURE — 74000 XR ABD (KUB): CPT

## 2017-05-02 PROCEDURE — 36415 COLL VENOUS BLD VENIPUNCTURE: CPT | Performed by: INTERNAL MEDICINE

## 2017-05-02 PROCEDURE — 74011000250 HC RX REV CODE- 250: Performed by: INTERNAL MEDICINE

## 2017-05-02 PROCEDURE — 76060000032 HC ANESTHESIA 0.5 TO 1 HR: Performed by: SPECIALIST

## 2017-05-02 PROCEDURE — 85027 COMPLETE CBC AUTOMATED: CPT | Performed by: INTERNAL MEDICINE

## 2017-05-02 PROCEDURE — 74011250636 HC RX REV CODE- 250/636: Performed by: INTERNAL MEDICINE

## 2017-05-02 PROCEDURE — 0DBL8ZX EXCISION OF TRANSVERSE COLON, VIA NATURAL OR ARTIFICIAL OPENING ENDOSCOPIC, DIAGNOSTIC: ICD-10-PCS | Performed by: SPECIALIST

## 2017-05-02 PROCEDURE — 77030010936 HC CLP LIG BSC -C: Performed by: SPECIALIST

## 2017-05-02 PROCEDURE — 76040000007: Performed by: SPECIALIST

## 2017-05-02 PROCEDURE — 80048 BASIC METABOLIC PNL TOTAL CA: CPT | Performed by: INTERNAL MEDICINE

## 2017-05-02 PROCEDURE — 0DBP8ZX EXCISION OF RECTUM, VIA NATURAL OR ARTIFICIAL OPENING ENDOSCOPIC, DIAGNOSTIC: ICD-10-PCS | Performed by: SPECIALIST

## 2017-05-02 PROCEDURE — 85610 PROTHROMBIN TIME: CPT | Performed by: INTERNAL MEDICINE

## 2017-05-02 PROCEDURE — 74011250636 HC RX REV CODE- 250/636: Performed by: SPECIALIST

## 2017-05-02 RX ORDER — SODIUM CHLORIDE 9 MG/ML
50 INJECTION, SOLUTION INTRAVENOUS CONTINUOUS
Status: DISPENSED | OUTPATIENT
Start: 2017-05-02 | End: 2017-05-02

## 2017-05-02 RX ORDER — PROPOFOL 10 MG/ML
INJECTION, EMULSION INTRAVENOUS AS NEEDED
Status: DISCONTINUED | OUTPATIENT
Start: 2017-05-02 | End: 2017-05-02 | Stop reason: HOSPADM

## 2017-05-02 RX ORDER — HYDROCORTISONE SODIUM SUCCINATE 100 MG/2ML
100 INJECTION, POWDER, FOR SOLUTION INTRAMUSCULAR; INTRAVENOUS EVERY 8 HOURS
Status: DISCONTINUED | OUTPATIENT
Start: 2017-05-02 | End: 2017-05-04

## 2017-05-02 RX ORDER — SODIUM CHLORIDE 9 MG/ML
INJECTION, SOLUTION INTRAVENOUS
Status: DISCONTINUED | OUTPATIENT
Start: 2017-05-02 | End: 2017-05-02 | Stop reason: HOSPADM

## 2017-05-02 RX ORDER — SODIUM CHLORIDE 0.9 % (FLUSH) 0.9 %
5-10 SYRINGE (ML) INJECTION EVERY 8 HOURS
Status: COMPLETED | OUTPATIENT
Start: 2017-05-02 | End: 2017-05-02

## 2017-05-02 RX ORDER — SODIUM CHLORIDE 0.9 % (FLUSH) 0.9 %
5-10 SYRINGE (ML) INJECTION AS NEEDED
Status: ACTIVE | OUTPATIENT
Start: 2017-05-02 | End: 2017-05-02

## 2017-05-02 RX ORDER — DEXTROMETHORPHAN/PSEUDOEPHED 2.5-7.5/.8
1.2 DROPS ORAL
Status: DISCONTINUED | OUTPATIENT
Start: 2017-05-02 | End: 2017-05-02 | Stop reason: HOSPADM

## 2017-05-02 RX ADMIN — PROPOFOL 30 MG: 10 INJECTION, EMULSION INTRAVENOUS at 11:37

## 2017-05-02 RX ADMIN — PROPOFOL 30 MG: 10 INJECTION, EMULSION INTRAVENOUS at 11:01

## 2017-05-02 RX ADMIN — PROPOFOL 30 MG: 10 INJECTION, EMULSION INTRAVENOUS at 11:35

## 2017-05-02 RX ADMIN — PROPOFOL 30 MG: 10 INJECTION, EMULSION INTRAVENOUS at 11:27

## 2017-05-02 RX ADMIN — PROPOFOL 30 MG: 10 INJECTION, EMULSION INTRAVENOUS at 10:52

## 2017-05-02 RX ADMIN — PROPOFOL 30 MG: 10 INJECTION, EMULSION INTRAVENOUS at 11:06

## 2017-05-02 RX ADMIN — PROPOFOL 50 MG: 10 INJECTION, EMULSION INTRAVENOUS at 11:08

## 2017-05-02 RX ADMIN — PROPOFOL 50 MG: 10 INJECTION, EMULSION INTRAVENOUS at 10:43

## 2017-05-02 RX ADMIN — PROPOFOL 30 MG: 10 INJECTION, EMULSION INTRAVENOUS at 10:55

## 2017-05-02 RX ADMIN — PROPOFOL 30 MG: 10 INJECTION, EMULSION INTRAVENOUS at 11:29

## 2017-05-02 RX ADMIN — PROPOFOL 30 MG: 10 INJECTION, EMULSION INTRAVENOUS at 11:18

## 2017-05-02 RX ADMIN — PROPOFOL 30 MG: 10 INJECTION, EMULSION INTRAVENOUS at 11:41

## 2017-05-02 RX ADMIN — PROPOFOL 30 MG: 10 INJECTION, EMULSION INTRAVENOUS at 11:21

## 2017-05-02 RX ADMIN — PROPOFOL 30 MG: 10 INJECTION, EMULSION INTRAVENOUS at 11:23

## 2017-05-02 RX ADMIN — PROPOFOL 30 MG: 10 INJECTION, EMULSION INTRAVENOUS at 11:25

## 2017-05-02 RX ADMIN — PROPOFOL 30 MG: 10 INJECTION, EMULSION INTRAVENOUS at 11:31

## 2017-05-02 RX ADMIN — PROPOFOL 30 MG: 10 INJECTION, EMULSION INTRAVENOUS at 11:43

## 2017-05-02 RX ADMIN — PROPOFOL 30 MG: 10 INJECTION, EMULSION INTRAVENOUS at 11:33

## 2017-05-02 RX ADMIN — PROPOFOL 30 MG: 10 INJECTION, EMULSION INTRAVENOUS at 10:46

## 2017-05-02 RX ADMIN — PROPOFOL 50 MG: 10 INJECTION, EMULSION INTRAVENOUS at 10:40

## 2017-05-02 RX ADMIN — PROPOFOL 30 MG: 10 INJECTION, EMULSION INTRAVENOUS at 11:04

## 2017-05-02 RX ADMIN — LEVOFLOXACIN 500 MG: 5 INJECTION, SOLUTION INTRAVENOUS at 19:56

## 2017-05-02 RX ADMIN — HYDROCORTISONE SODIUM SUCCINATE 100 MG: 100 INJECTION, POWDER, FOR SOLUTION INTRAMUSCULAR; INTRAVENOUS at 15:09

## 2017-05-02 RX ADMIN — PROPOFOL 30 MG: 10 INJECTION, EMULSION INTRAVENOUS at 11:20

## 2017-05-02 RX ADMIN — METRONIDAZOLE 500 MG: 500 INJECTION, SOLUTION INTRAVENOUS at 18:31

## 2017-05-02 RX ADMIN — Medication 10 ML: at 15:09

## 2017-05-02 RX ADMIN — PROPOFOL 30 MG: 10 INJECTION, EMULSION INTRAVENOUS at 10:58

## 2017-05-02 RX ADMIN — PROPOFOL 50 MG: 10 INJECTION, EMULSION INTRAVENOUS at 10:41

## 2017-05-02 RX ADMIN — PROPOFOL 50 MG: 10 INJECTION, EMULSION INTRAVENOUS at 10:42

## 2017-05-02 RX ADMIN — PROPOFOL 30 MG: 10 INJECTION, EMULSION INTRAVENOUS at 11:11

## 2017-05-02 RX ADMIN — HYDROCORTISONE SODIUM SUCCINATE 100 MG: 100 INJECTION, POWDER, FOR SOLUTION INTRAMUSCULAR; INTRAVENOUS at 21:49

## 2017-05-02 RX ADMIN — PROPOFOL 30 MG: 10 INJECTION, EMULSION INTRAVENOUS at 11:39

## 2017-05-02 RX ADMIN — PROPOFOL 30 MG: 10 INJECTION, EMULSION INTRAVENOUS at 10:49

## 2017-05-02 RX ADMIN — METRONIDAZOLE 500 MG: 500 INJECTION, SOLUTION INTRAVENOUS at 01:05

## 2017-05-02 RX ADMIN — PROPOFOL 30 MG: 10 INJECTION, EMULSION INTRAVENOUS at 11:14

## 2017-05-02 RX ADMIN — PROPOFOL 30 MG: 10 INJECTION, EMULSION INTRAVENOUS at 11:16

## 2017-05-02 RX ADMIN — SODIUM CHLORIDE: 9 INJECTION, SOLUTION INTRAVENOUS at 10:40
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
## 2017-05-02 NOTE — PROCEDURES
[address] [facility] 03, [address] [facility]                 Colonoscopy Procedure Note    Indications:   See Preoperative Diagnosis above  Referring Physician: None  Anesthesia/Sedation: MAC anesthesia Propofol  Endoscopist:  Dr. Saulo Barahona  Assistant:  Endoscopy Technician-1: Jasson Benitez  Endoscopy RN-1: Tahmina Wiseman RN  Preoperative diagnosis: Anemia  Postoperative diagnosis: 1. Polypoidal mass    Procedure in Detail:  Informed consent was obtained for the procedure, including sedation. Risks of perforation, hemorrhage, adverse drug reaction, and aspiration were discussed. The patient was placed in the left lateral decubitus position. Based on the pre-procedure assessment, including review of the patient's medical history, medications, allergies, and review of systems, he had been deemed to be an appropriate candidate for moderate sedation; he was therefore sedated with the medications listed above. The patient was monitored continuously with ECG tracing, pulse oximetry, blood pressure monitoring, and direct observations. A rectal examination was performed. The [id number] was inserted into the rectum and advanced under direct vision to the cecum, which was identified by the cecal strap. The quality of the colonic preparation was adequate. A careful inspection was made as the colonoscope was withdrawn, including a retroflexed view of the rectum; findings and interventions are described below. Appropriate photodocumentation was obtained. Findings:  Rectum: normal, biopsies done. Sigmoid: normal  Descending Colon: normal  Transverse Colon: Multiple pseudopolyps seen at splenic flexure, biopsied. There was narrowing of lumen with  near complete obstruction in the distal transverse colon with multiple pseudopolyps which were biopsied. No definite mass seen. One Hemoclip was placed proximal to the obstruction and another clip was placed distal to the obstruction. Ascending Colon: patchy ulcers consistent with crohn disease biopsied. Cecum: patchy ulcers consistent with crohn disease biopsied. Terminal Ileum: not entered    Specimens:    colon, rectal biopsies    EBL: None    Complications: None; patient tolerated the procedure well. Recommendations:      - Clear liquids Po, IV steroids, Consult Dr Stephan Garcia (already informed), await biopsy results.     Signed By: Michelle Rice MD                        May 2, 2017

## 2017-05-02 NOTE — PROGRESS NOTES
Assumed care of the patient at the time of this note from [facility] ), CRNA. Patient transported to recovery by Endoscopy Procedure RN. SBAR report given to recovery RN.

## 2017-05-02 NOTE — ROUTINE PROCESS
TRANSFER - OUT REPORT:    Verbal report given to Ellie Sesay RN on Michael John  being transferred to [facility]  for routine progression of care       Report consisted of patients Situation, Background, Assessment and   Recommendations(SBAR). Information from the following report(s) SBAR was reviewed with the receiving nurse. Lines:   Peripheral IV 04/29/17 Right Antecubital (Active)   Site Assessment Clean, dry, & intact 5/2/2017  1:00 AM   Phlebitis Assessment 0 5/2/2017  1:00 AM   Infiltration Assessment 0 5/2/2017  1:00 AM   Dressing Status Clean, dry, & intact 5/2/2017  1:00 AM   Dressing Type Transparent 5/2/2017  1:00 AM   Hub Color/Line Status Pink; Infusing 5/2/2017  1:00 AM   Action Taken Open ports on tubing capped 5/1/2017  8:22 AM   Alcohol Cap Used Yes 5/1/2017  4:20 PM        Opportunity for questions and clarification was provided.       Patient transported with:  Transportation Tech

## 2017-05-02 NOTE — PROGRESS NOTES
Rosaura ns  1975  [id number]    Situation:  Verbal report received from: Jareth Guard  Preoperative diagnosis: Anemia    Background:  Procedure: Procedure(s):  COLONOSCOPY  Physician performing procedure; Dr. Rupinder Omalley    Patient diabetic:no             Last BS:         Time:            Patient taking blood thinners:no   Patient has a defibrillator: no   Patient needs antibiotics: no   Does patient have a PCA infusing: no   Colon prep results: no    Does the patient have SCD in place:no   Is patient on CONTACT precautions:no                               What kind of CONTACT precautions:     Assessment:  Vital signs stable yes  Alert and oriented yes  Abdominal assessment: round and soft yes  Patient IV status: yes      Recommendation:  Family or Friend present no    Permission to share finding with Family or Friend yes and n/a

## 2017-05-02 NOTE — ANESTHESIA PREPROCEDURE EVALUATION
Anesthetic History   No history of anesthetic complications            Review of Systems / Medical History  Patient summary reviewed, nursing notes reviewed and pertinent labs reviewed    Pulmonary  Within defined limits                 Neuro/Psych   Within defined limits           Cardiovascular  Within defined limits                Exercise tolerance: >4 METS     GI/Hepatic/Renal  Within defined limits              Endo/Other  Within defined limits           Other Findings   Comments: Enterocolitis  + N/V           Physical Exam    Airway  Mallampati: II  TM Distance: > 6 cm  Neck ROM: normal range of motion   Mouth opening: Normal     Cardiovascular  Regular rate and rhythm,  S1 and S2 normal,  no murmur, click, rub, or gallop             Dental    Dentition: Upper partial plate and Poor dentition     Pulmonary  Breath sounds clear to auscultation               Abdominal  GI exam deferred       Other Findings            Anesthetic Plan    ASA: 2  Anesthesia type: general          Induction: Intravenous  Anesthetic plan and risks discussed with: Patient

## 2017-05-02 NOTE — PROGRESS NOTES
Subjective  Had colonoscopy today- shows likely crohns     Temp:  [97.4 °F (36.3 °C)-99.4 °F (37.4 °C)]   Pulse (Heart Rate):  []   BP: (103-131)/(66-88)   Resp Rate:  [13-20]   O2 Sat (%):  [94 %-100 %]   Weight:  [151 lb 3.2 oz (68.6 kg)]   05/02 0701 - 05/02 1900  In: 500 [I.V.:500]  Out: -   04/30 1901 - 05/02 0700  In: 240 [P.O.:240]  Out: 175 [Urine:175]      Objective  Gen alert in NAD  Lungs- CTA  H-RRR  Abd- soft still distended non tender    Active Problems:    Enterocolitis (4/29/2017)          Assessment & Plan  Probable Crohn's - await biopsy  Just started steroids- will wait to see results- hopefully this will alleviate the obstruction  clears

## 2017-05-02 NOTE — PROGRESS NOTES
Dr Jackman Dom notified of pt abdominal distention. Will see pt in RR.  Pt still denies pain at this time

## 2017-05-02 NOTE — ANESTHESIA POSTPROCEDURE EVALUATION
Post-Anesthesia Evaluation and Assessment    Patient: Ember Canales MRN: [id number]  SSN: [id number]    YOB: 1975  Age: 40 y.o. Sex: male       Cardiovascular Function/Vital Signs  Visit Vitals    /81    Pulse 78    Temp 36.3 °C (97.4 °F)    Resp 18    Ht 6' (1.829 m)    Wt 68.6 kg (151 lb 3.2 oz)    SpO2 95%    BMI 20.51 kg/m2       Patient is status post MAC anesthesia for Procedure(s):  COLONOSCOPY  COLON BIOPSY  RESOLUTION CLIP. Nausea/Vomiting: None    Postoperative hydration reviewed and adequate. Pain:  Pain Scale 1: Numeric (0 - 10) (05/02/17 1330)  Pain Intensity 1: 0 (05/02/17 1330)   Managed    Neurological Status: At baseline    Mental Status and Level of Consciousness: Arousable    Pulmonary Status:   O2 Device: Room air (05/02/17 1330)   Adequate oxygenation and airway patent    Complications related to anesthesia: None    Post-anesthesia assessment completed.  No concerns    Signed By: Darlene Cisneros MD     May 2, 2017

## 2017-05-02 NOTE — PROGRESS NOTES
TRANSFER - IN REPORT:    Verbal report received from Suzan Brown RN (name) on Jasmyn Brunson  being received from ENDO (unit) for routine progression of care      Report consisted of patients Situation, Background, Assessment and   Recommendations(SBAR). Information from the following report(s) Procedure Summary was reviewed with the receiving nurse. Opportunity for questions and clarification was provided. Assessment completed upon patients arrival to unit and care assumed. Pt currently in radiology having KUB done, and will return to ENDO before returning to unit.

## 2017-05-02 NOTE — PROGRESS NOTES
Pt abdomen semi-soft and slightly distended. Pt denies richmond at this time.  \"My abdomen feels flat\" Pt has not passed any flatus

## 2017-05-02 NOTE — PROGRESS NOTES
Nurse encouraging patient to drink golytely. Nurse put golytely on ice and gave flavor packets to patient.

## 2017-05-02 NOTE — ROUTINE PROCESS
Rc English  1975  [id number]    Situation:  Verbal report received from: Low   Procedure: Procedure(s):  COLONOSCOPY  COLON BIOPSY  RESOLUTION CLIP    Background:    Preoperative diagnosis: Anemia  Postoperative diagnosis: 1. Polypoidal mass    :  Dr. Fernanda Drake  Assistant(s): Endoscopy Technician-1: Omar Ornelas  Endoscopy RN-1: Edilberto Garvey RN    Specimens:   ID Type Source Tests Collected by Time Destination   1 :  DistalTransverse colon biopsy Preservative   Sendy Cantu MD 5/2/2017 1124 Pathology   2 : Right colon biopsy Preservative   Sendy Cantu MD 5/2/2017 1131 Pathology   3 : Proximal Transverse colon biopsy Preservative   Sendy Cantu MD 5/2/2017 1138 Pathology   4 : Splenic flexure biopsy Preservative   Sendy Cantu MD 5/2/2017 1147 Pathology   5 : Rectum biopsy Preservative   Sendy Cantu MD 5/2/2017 1147 Pathology     H. Pylori  no    Assessment:  Intra-procedure medications       Anesthesia gave intra-procedure sedation and medications, see anesthesia flow sheet yes    Intravenous fluids: NS@ KVO     Vital signs stable     Abdominal assessment: round and soft       Recommendation:  Discharge patient per MD order.   Return to [address] [place] [address] to share finding with family or friend n/a

## 2017-05-03 PROCEDURE — 74011250636 HC RX REV CODE- 250/636: Performed by: INTERNAL MEDICINE

## 2017-05-03 PROCEDURE — 74011250636 HC RX REV CODE- 250/636: Performed by: SPECIALIST

## 2017-05-03 PROCEDURE — 74011000250 HC RX REV CODE- 250: Performed by: INTERNAL MEDICINE

## 2017-05-03 PROCEDURE — 65270000029 HC RM PRIVATE

## 2017-05-03 RX ADMIN — LEVOFLOXACIN 500 MG: 5 INJECTION, SOLUTION INTRAVENOUS at 19:06

## 2017-05-03 RX ADMIN — Medication 10 ML: at 06:29

## 2017-05-03 RX ADMIN — Medication 10 ML: at 14:28

## 2017-05-03 RX ADMIN — METRONIDAZOLE 500 MG: 500 INJECTION, SOLUTION INTRAVENOUS at 17:00

## 2017-05-03 RX ADMIN — METRONIDAZOLE 500 MG: 500 INJECTION, SOLUTION INTRAVENOUS at 10:06

## 2017-05-03 RX ADMIN — HYDROCORTISONE SODIUM SUCCINATE 100 MG: 100 INJECTION, POWDER, FOR SOLUTION INTRAMUSCULAR; INTRAVENOUS at 06:29

## 2017-05-03 RX ADMIN — HYDROCORTISONE SODIUM SUCCINATE 100 MG: 100 INJECTION, POWDER, FOR SOLUTION INTRAMUSCULAR; INTRAVENOUS at 21:36

## 2017-05-03 RX ADMIN — METRONIDAZOLE 500 MG: 500 INJECTION, SOLUTION INTRAVENOUS at 01:25

## 2017-05-03 RX ADMIN — HYDROCORTISONE SODIUM SUCCINATE 100 MG: 100 INJECTION, POWDER, FOR SOLUTION INTRAMUSCULAR; INTRAVENOUS at 14:25

## 2017-05-03 RX ADMIN — Medication 10 ML: at 21:36

## 2017-05-03 RX ADMIN — Medication 10 ML: at 01:24

## 2017-05-03 RX ADMIN — SODIUM CHLORIDE 75 ML/HR: 900 INJECTION, SOLUTION INTRAVENOUS at 10:06

## 2017-05-03 RX ADMIN — HYDROMORPHONE HYDROCHLORIDE 1 MG: 1 INJECTION, SOLUTION INTRAMUSCULAR; INTRAVENOUS; SUBCUTANEOUS at 01:24

## 2017-05-03 NOTE — PROGRESS NOTES
Bedside shift change report given to Lorne Echeverria (oncoming nurse) by Adriana Martínez (offgoing nurse). Report included the following information SBAR, Kardex, Procedure Summary, Intake/Output, MAR and Recent Results.

## 2017-05-03 NOTE — PROGRESS NOTES
NUTRITION education brief         Pt visited for diet education and interview today. Discussed at rounds and Hospitalist ok with trial of full liquids for lunch. Diet advanced and supplements switched to Ensure Enlive TID (105kcal, 60g protein). Lunch order placed. Pt reports UBW >168#. Temporal wasting observed. Minimal PO over past few weeks with multiple hospitalization noted. Did well with chicken, mashed potatoes, applesauce, canned fruit etc. Low fiber diet education provided with handouts given. Pt already with good knowledge of high fiber foods with good teach back. Handouts given. Agreeable to Ensure for now. Anxious for further diet advancement if lunch tolerated. Will continue to follow for PO tolerance/advancement. See full assessment from yesterday for details and goals and monitoring/evaluation.      Clementina Morrow, RD

## 2017-05-03 NOTE — PROGRESS NOTES
Bedside and Verbal shift change report given to ROSEANNE Gore  (oncoming nurse) by Victor M Gaxiola RN  (offgoing nurse). Report included the following information SBAR and Kardex.

## 2017-05-03 NOTE — PROGRESS NOTES
Bedside shift change report given to Torie Kim RN (oncoming nurse) by Samir Adams RN (offgoing nurse). Report included the following information SBAR, Intake/Output and MAR.

## 2017-05-03 NOTE — PROGRESS NOTES
[address]  [address] [facility] [address]   [phone]                GI PROGRESS NOTE  Rochellekenya Adeel, [facility]  Work Cell: [phone]      NAME:   Diego Malave   :    1975   MRN:    [id number]     Assessment/Plan   1. Colitis: s/p colonoscopy yesterday which demonstrated multiple pseudopolyps with near complete obstruction of the distal transverse colon. Patchy ulcers seen in cecum and ascending colon concerning for likely Crohn's disease. Pathology pending. Stool studies unremarkable thus far. Continue supportive management and steroids. Advance to full liquid and then soft diet. Monitor course. Surgery following. 2. Ascites: very little noted on ultrasound, therefore paracentesis unable to be performed. 3. Abdominal pain: He has been to [facility] multiple times but left AMA. Patient Active Problem List   Diagnosis Code    Enterocolitis K52.9       Subjective:     Frustrated that he cannot have \"cream of wheat. \" Denies any abdominal pain, but reports it is all secondary to being hungry. Denies any nausea or vomiting. Passing flatus and had multiple loose BMs since yesterday. Review of Systems    Constitutional: negative fever, negative chills, negative weight loss  Eyes:   negative visual changes  ENT:   negative sore throat, tongue or lip swelling  Respiratory:  negative cough, negative dyspnea  Cards:  negative for chest pain, palpitations, lower extremity edema  GI:   See HPI  :  negative for frequency, dysuria  Integument:  negative for rash and pruritus  Heme:  negative for easy bruising and gum/nose bleeding  Musculoskel: negative for myalgias, back pain and muscle weakness  Neuro: negative for headaches, dizziness, vertigo  Psych:  negative for feelings of anxiety, depression     Objective:     VITALS:   Last 24hrs VS reviewed since prior hospitalist progress note.  Most recent are:  Visit Vitals    /83 (BP 1 Location: Left arm)    Pulse 72    Temp 98 °F (36.7 °C)    Resp 16    Ht 6' (1.829 m)    Wt 68.6 kg (151 lb 3.2 oz)    SpO2 95%    BMI 20.51 kg/m2       Intake/Output Summary (Last 24 hours) at 05/03/17 1035  Last data filed at 05/03/17 0631   Gross per 24 hour   Intake             1240 ml   Output             1425 ml   Net             -185 ml        PHYSICAL EXAM:  General   well developed, thin, alert, frustrated, in no acute distress  EENT  Normocephalic, Atraumatic, PERRLA, EOMI, sclera clear  Respiratory   Clear To Auscultation bilaterally - no wheezes, rales, rhonchi, or crackles  Cardiology  Regular Rate and Rythmn  - no murmurs, rubs or gallops  Abdominal  Soft, distended, tympanic, non-tender, hypoactive bowel sounds, no hepatosplenomegaly, no palpable mass  Extremities  No clubbing, cyanosis, or edema. Pulses intact. Neurological  No focal neurological deficits noted  Psychological  Oriented x 3. Normal affect. Lab Data No results found for this or any previous visit (from the past 12 hour(s)). Medications: Reviewed    PMH/SH reviewed - no change compared to H&P  Mid-Level Provider: Ward Gaffney NP   Date/Time:  5/3/2017   I have examined the patient. I have reviewed the chart and agree with the documentation recorded by the NP, including the assessment, treatment plan, and disposition.       Roxane Acevedo MD
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
## 2017-05-03 NOTE — ADT AUTH CERT NOTES
Systemic or Infectious Condition GRG - Care Day 5 (5/3/2017) by Bing Umana RN        Review Entered Review Status       5/3/2017 Completed       Details              Care Day: 5 Care Date: 5/3/2017 Level of Care:        Guideline Day 3        Level Of Care       (X) * Activity level acceptable              Clinical Status       (X) * Hemodynamic stability       (X) * Respiratory status acceptable       (X) * Neurologic status acceptable       (X) * Temperature status acceptable       ( ) * No infection, or status acceptable       (X) * No neutropenia, or status acceptable       ( ) * Special infection or injury situations absent       ( ) * Electrolyte status acceptable       ( ) * General Discharge Criteria met              Interventions       ( ) * Intake acceptable       ( ) * No inpatient interventions needed                                   * Milestone              Additional Notes       Last 24 hrs: Pt frustrated and upset about only being able to have clear liquids.  Passing flatus and having liq stools.        No c/o abd pain, n/v, just hunger,        .T 98 P 67 RR 16 /72 spO2 98%       GI to adv diet       Cont steroids and abx       No surgical intervention at this point       H/H 9.7/30.3, Platelet 874, INR 1.2       Meds: NS@ 75ml/hr IV, Hydrocortisone IV q6h, Levaquin IV qday, Flagyl IV q8h, Dilaudid IV       Other orders: clear liquid diet, pulse oximetry, up ad rosa           Systemic or Infectious Condition GRG - Care Day 4 (5/2/2017) by Bing Umana RN        Review Entered Review Status       5/2/2017 Completed       Details              Care Day: 4 Care Date: 5/2/2017 Level of Care:        Guideline Day 3        Level Of Care       (X) * Activity level acceptable              Clinical Status       (X) * Hemodynamic stability       (X) * Respiratory status acceptable       (X) * Neurologic status acceptable       (X) * Temperature status acceptable       ( ) * No infection, or status acceptable       (X) * No neutropenia, or status acceptable       ( ) * Special infection or injury situations absent       ( ) * Electrolyte status acceptable       ( ) * General Discharge Criteria met              Interventions       ( ) * Intake acceptable       ( ) * No inpatient interventions needed                                   * Milestone              Additional Notes       Had colonoscopy today- shows likely crohns       Probable Crohn's - await biopsy       Just started steroids- will wait to see results- hopefully this will alleviate the obstruction       Clears       T 97.6 P 83 RR 14 /85 spO2 99%       Meds: NS@ 75ml/hr IV, Levaquin IV qday, Flagyl IV q8h, hydrocortisone IV q8h       Other orders: clear liquid diet, abdominal xray, pulse oximetry, up ad rosa

## 2017-05-03 NOTE — PROGRESS NOTES
General Surgery Daily Progress Note    Admit Date: 2017  Post-Operative Day: 1 Day Post-Op from Procedure(s):  COLONOSCOPY  COLON BIOPSY  RESOLUTION CLIP     Subjective:     Last 24 hrs: Pt frustrated and upset about only being able to have clear liquids. Passing flatus and having liq stools. No c/o abd pain, n/v, just hunger,   . Objective:     Blood pressure 115/72, pulse 67, temperature 98 °F (36.7 °C), resp. rate 16, height 6' (1.829 m), weight 151 lb 3.2 oz (68.6 kg), SpO2 98 %. Temp (24hrs), Av °F (36.7 °C), Min:97.4 °F (36.3 °C), Max:98.8 °F (37.1 °C)      _____________________  Physical Exam:     Alert and Oriented, x3, frustrated, in no acute distress. Cardiovascular: RRR, no peripheral edema  Lungs:CTAB   Abdomen: flat, hypoactive BS but present throughout, NT      Assessment:   Active Problems:    Enterocolitis (2017)            Plan:     GI to adv diet  Cont steroids and abx  No surgical intervention at this point    Data Review:    Recent Labs      17   04417   0434   WBC  6.9  8.1   HGB  9.7*  10.4*   HCT  30.3*  31.7*   PLT  870*  837*     Recent Labs      17   0638  179  17   0434   NA   --   134*  137   K   --   3.7  3.8   CL   --   101  100   CO2   --   27  28   GLU   --   90  110*   BUN   --   2*  3*   CREA   --   0.63*  0.62*   CA   --   8.3*  8.5   MG   --    --   2.0   INR  1.2*   --    --      No results for input(s): AML, LPSE in the last 72 hours.         ______________________  Medications:    Current Facility-Administered Medications   Medication Dose Route Frequency    hydrocortisone Sod Succ (PF) (SOLU-CORTEF) injection 100 mg  100 mg IntraVENous Q8H    HYDROmorphone (PF) (DILAUDID) injection 1 mg  1 mg IntraVENous Q2H PRN    HYDROcodone-acetaminophen (NORCO) 5-325 mg per tablet 1 Tab  1 Tab Oral Q6H PRN    prochlorperazine (COMPAZINE) with saline injection 5 mg  5 mg IntraVENous Q8H PRN    0.9% sodium chloride infusion  75 mL/hr IntraVENous CONTINUOUS    metroNIDAZOLE (FLAGYL) IVPB premix 500 mg  500 mg IntraVENous Q8H    levoFLOXacin (LEVAQUIN) 500 mg in D5W IVPB  500 mg IntraVENous Q24H    sodium chloride (NS) flush 5-10 mL  5-10 mL IntraVENous Q8H    sodium chloride (NS) flush 5-10 mL  5-10 mL IntraVENous PRN    acetaminophen (TYLENOL) tablet 650 mg  650 mg Oral Q4H PRN    ondansetron (ZOFRAN) injection 4 mg  4 mg IntraVENous Q4H PRN       Alona Kussmaul, NP  5/3/2017    Pt seen and examined   He started on full liquids. Feeling better. Still bloated but had normal BM  Gen- Alert in NAD  Abd- Soft / no longer tender. Still distended  Inflammatory bowel disease. Improving with steroids  Diet and med treatment per GI. Will follow.

## 2017-05-03 NOTE — PROGRESS NOTES
Spiritual Care Partner Volunteer visited patient in Surgical Unit on 05/03/17. Documented by:    Duane Connor M.S., MShariDiv.   [address] [place] [address] ([id number])

## 2017-05-04 VITALS
RESPIRATION RATE: 16 BRPM | SYSTOLIC BLOOD PRESSURE: 116 MMHG | HEART RATE: 53 BPM | DIASTOLIC BLOOD PRESSURE: 83 MMHG | TEMPERATURE: 97.8 F | HEIGHT: 72 IN | BODY MASS INDEX: 20.48 KG/M2 | OXYGEN SATURATION: 97 % | WEIGHT: 151.2 LBS

## 2017-05-04 PROCEDURE — 74011636637 HC RX REV CODE- 636/637: Performed by: INTERNAL MEDICINE

## 2017-05-04 PROCEDURE — 74011250637 HC RX REV CODE- 250/637: Performed by: INTERNAL MEDICINE

## 2017-05-04 PROCEDURE — 74011250636 HC RX REV CODE- 250/636: Performed by: INTERNAL MEDICINE

## 2017-05-04 PROCEDURE — 74011000250 HC RX REV CODE- 250: Performed by: INTERNAL MEDICINE

## 2017-05-04 RX ORDER — LEVOFLOXACIN 500 MG/1
500 TABLET, FILM COATED ORAL EVERY 24 HOURS
Status: DISCONTINUED | OUTPATIENT
Start: 2017-05-04 | End: 2017-05-04 | Stop reason: HOSPADM

## 2017-05-04 RX ORDER — MESALAMINE 500 MG/1
1000 CAPSULE, EXTENDED RELEASE ORAL 3 TIMES DAILY
Qty: 90 CAP | Refills: 2 | Status: SHIPPED | OUTPATIENT
Start: 2017-05-04 | End: 2017-09-28 | Stop reason: ALTCHOICE

## 2017-05-04 RX ORDER — OXYCODONE HYDROCHLORIDE 5 MG/1
5 TABLET ORAL
Qty: 30 TAB | Refills: 0 | Status: SHIPPED | OUTPATIENT
Start: 2017-05-04 | End: 2017-09-28

## 2017-05-04 RX ORDER — PREDNISONE 10 MG/1
60 TABLET ORAL
Qty: 147 TAB | Refills: 0 | Status: SHIPPED | OUTPATIENT
Start: 2017-05-04 | End: 2017-05-11

## 2017-05-04 RX ORDER — PREDNISONE 20 MG/1
60 TABLET ORAL
Status: DISCONTINUED | OUTPATIENT
Start: 2017-05-04 | End: 2017-05-04 | Stop reason: HOSPADM

## 2017-05-04 RX ORDER — METRONIDAZOLE 250 MG/1
500 TABLET ORAL EVERY 8 HOURS
Status: DISCONTINUED | OUTPATIENT
Start: 2017-05-04 | End: 2017-05-04 | Stop reason: HOSPADM

## 2017-05-04 RX ADMIN — PREDNISONE 60 MG: 20 TABLET ORAL at 09:31

## 2017-05-04 RX ADMIN — HYDROMORPHONE HYDROCHLORIDE 1 MG: 1 INJECTION, SOLUTION INTRAMUSCULAR; INTRAVENOUS; SUBCUTANEOUS at 14:03

## 2017-05-04 RX ADMIN — SODIUM CHLORIDE 75 ML/HR: 900 INJECTION, SOLUTION INTRAVENOUS at 02:09

## 2017-05-04 RX ADMIN — METRONIDAZOLE 500 MG: 250 TABLET ORAL at 09:31

## 2017-05-04 RX ADMIN — METRONIDAZOLE 500 MG: 500 INJECTION, SOLUTION INTRAVENOUS at 02:09
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
## 2017-05-04 NOTE — PROGRESS NOTES
[address]  [address]   [phone]                GI PROGRESS NOTE  Adriana Denis, AGACNP-BC  Work Cell: [phone]      NAME:   Diego Malave   :    1975   MRN:    [id number]     Assessment/Plan   1. Colitis: s/p colonoscopy yesterday which demonstrated multiple pseudopolyps with near complete obstruction of the distal transverse colon. Patchy ulcers seen in cecum and ascending colon concerning for likely Crohn's disease. Pathology with mild chronic active colitis related to chronic IBD. Stool studies unremarkable thus far. Continue supportive management,  and steroids. Soft diet as tolerated. We can stop the antibiotics and he can go home on PO steroids 60 mg daily (decrease by 10 mg/week) and Pentasa 1 gm PO TID. FU at my office in 2-3 weeks. 2. Ascites: very little noted on ultrasound, therefore paracentesis unable to be performed. 3. Abdominal pain: He has been to [facility] multiple times but left AMA. Patient Active Problem List   Diagnosis Code    Enterocolitis K52.9       Subjective:     Feeling well. Denies any abdominal pain. Tolerating soft diet without nausea or vomiting.  Reports had formed, solid stool this AM.      Review of Systems    Constitutional: negative fever, negative chills, negative weight loss  Eyes:   negative visual changes  ENT:   negative sore throat, tongue or lip swelling  Respiratory:  negative cough, negative dyspnea  Cards:  negative for chest pain, palpitations, lower extremity edema  GI:   See HPI  :  negative for frequency, dysuria  Integument:  negative for rash and pruritus  Heme:  negative for easy bruising and gum/nose bleeding  Musculoskel: negative for myalgias, back pain and muscle weakness  Neuro: negative for headaches, dizziness, vertigo  Psych:  negative for feelings of anxiety, depression     Objective:     VITALS:   Last 24hrs VS reviewed since prior hospitalist progress note. Most recent are:  Visit Vitals    /83    Pulse (!) 53    Temp 97.8 °F (36.6 °C)    Resp 16    Ht 6' (1.829 m)    Wt 68.6 kg (151 lb 3.2 oz)    SpO2 97%    BMI 20.51 kg/m2     No intake or output data in the 24 hours ending 05/04/17 1128       PHYSICAL EXAM:  General  well developed, thin, alert, in no acute distress  EENT Normocephalic, Atraumatic, PERRLA, EOMI, sclera clear  Respiratory Clear To Auscultation bilaterally - no wheezes, rales, rhonchi, or crackles  Cardiology Regular Rate and Rythmn - no murmurs, rubs or gallops  Abdominal  Soft, non-tender, non-distended, present bowel sounds, no hepatosplenomegaly, no palpable mass  Extremities No clubbing, cyanosis, or edema. Pulses intact. Neurological No focal neurological deficits noted  Psychological  Oriented x 3. Normal affect.      Lab Data:reviewed      Medications: Reviewed    PMH/SH reviewed - no change compared to H&P  Mid-Level Provider: Joann Lake NP   Date/Time:  5/4/2017
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
## 2017-05-04 NOTE — DISCHARGE INSTRUCTIONS
Colitis: Care Instructions  Your Care Instructions  Colitis is the medical term for swelling (inflammation) of the intestine. It can be caused by different things, such as an infection or loss of blood flow in the intestine. Other causes are problems like Crohn's disease or ulcerative colitis. Symptoms may include fever, diarrhea that may be bloody, or belly pain. Sometimes symptoms go away without treatment. But you may need treatment or more tests, such as blood tests or a stool test. Or you may need imaging tests like a CT scan or a colonoscopy. In some cases, the doctor may want to test a sample of tissue from the intestine. This test is called a biopsy. The doctor has checked you carefully, but problems can develop later. If you notice any problems or new symptoms, get medical treatment right away. Follow-up care is a key part of your treatment and safety. Be sure to make and go to all appointments, and call your doctor if you are having problems. It's also a good idea to know your test results and keep a list of the medicines you take. How can you care for yourself at home? · Rest until you feel better. · Your doctor may recommend that you eat bland foods. These include rice, dry toast or crackers, bananas, and applesauce. · To prevent dehydration, drink plenty of fluids. Choose water and other caffeine-free clear liquids until you feel better. If you have kidney, heart, or liver disease and have to limit fluids, talk with your doctor before you increase the amount of fluids you drink. · Be safe with medicines. Take your medicines exactly as prescribed. Call your doctor if you think you are having a problem with your medicine. You will get more details on the specific medicines your doctor prescribes. When should you call for help? Call 911 anytime you think you may need emergency care. For example, call if:  · You passed out (lost consciousness).   · You vomit blood or what looks like coffee grounds. · Your stools are maroon or very bloody. Call your doctor now or seek immediate medical care if:  · You have new or worse pain. · You have a new or higher fever. · You have new or worse symptoms. · You cannot keep fluids or medicines down. Watch closely for changes in your health, and be sure to contact your doctor if:  · You do not get better as expected. Where can you learn more? Go to http://luis-timbo.info/. Asuncion Duggan in the search box to learn more about \"Colitis: Care Instructions. \"  Current as of: August 9, 2016  Content Version: 11.2  © 4786-9158 [employer]. Care instructions adapted under license by [employer] (which disclaims liability or warranty for this information). If you have questions about a medical condition or this instruction, always ask your healthcare professional. [address] any warranty or liability for your use of this information.

## 2017-05-06 NOTE — PROGRESS NOTES
1310 Patient unable to fill prescription for mesalamine 500 mg 2 tabs TID, 90 tablets 2 refills with his pharmacy and needs an e-scrip sent via our system to another location, [facility] [phone]. [employer] CHANEL to notify. [facility] with Igor Lindquist NP who will call in Rx to pharmacy specified above. Notified patient.

## 2017-07-17 ENCOUNTER — HOSPITAL ENCOUNTER (EMERGENCY)
Age: 42
Discharge: HOME | End: 2017-07-18
Attending: EMERGENCY MEDICINE
Payer: MEDICAID

## 2017-07-17 DIAGNOSIS — K50.113 CROHN'S COLITIS, WITH FISTULA (HCC): Primary | ICD-10-CM

## 2017-07-17 LAB
ALBUMIN SERPL BCP-MCNC: 2.9 G/DL (ref 3.5–5)
ALBUMIN/GLOB SERPL: 0.6 {RATIO} (ref 1.1–2.2)
ALP SERPL-CCNC: 84 U/L (ref 45–117)
ALT SERPL-CCNC: 13 U/L (ref 12–78)
ANION GAP BLD CALC-SCNC: 11 MMOL/L (ref 5–15)
AST SERPL W P-5'-P-CCNC: 24 U/L (ref 15–37)
BILIRUB SERPL-MCNC: 0.2 MG/DL (ref 0.2–1)
BUN SERPL-MCNC: 9 MG/DL (ref 6–20)
BUN/CREAT SERPL: 10 (ref 12–20)
CALCIUM SERPL-MCNC: 9.6 MG/DL (ref 8.5–10.1)
CHLORIDE SERPL-SCNC: 99 MMOL/L (ref 97–108)
CO2 SERPL-SCNC: 26 MMOL/L (ref 21–32)
CREAT SERPL-MCNC: 0.93 MG/DL (ref 0.7–1.3)
GLOBULIN SER CALC-MCNC: 5 G/DL (ref 2–4)
GLUCOSE SERPL-MCNC: 125 MG/DL (ref 65–100)
LIPASE SERPL-CCNC: 83 U/L (ref 73–393)
POTASSIUM SERPL-SCNC: 4.4 MMOL/L (ref 3.5–5.1)
PROT SERPL-MCNC: 7.9 G/DL (ref 6.4–8.2)
SODIUM SERPL-SCNC: 136 MMOL/L (ref 136–145)

## 2017-07-17 PROCEDURE — 83690 ASSAY OF LIPASE: CPT | Performed by: EMERGENCY MEDICINE

## 2017-07-17 PROCEDURE — 96374 THER/PROPH/DIAG INJ IV PUSH: CPT

## 2017-07-17 PROCEDURE — 99284 EMERGENCY DEPT VISIT MOD MDM: CPT

## 2017-07-17 PROCEDURE — 96361 HYDRATE IV INFUSION ADD-ON: CPT

## 2017-07-17 PROCEDURE — 80053 COMPREHEN METABOLIC PANEL: CPT | Performed by: EMERGENCY MEDICINE

## 2017-07-17 PROCEDURE — 96375 TX/PRO/DX INJ NEW DRUG ADDON: CPT

## 2017-07-17 RX ORDER — HYDROMORPHONE HYDROCHLORIDE 1 MG/ML
1 INJECTION, SOLUTION INTRAMUSCULAR; INTRAVENOUS; SUBCUTANEOUS
Status: COMPLETED | OUTPATIENT
Start: 2017-07-17 | End: 2017-07-18

## 2017-07-17 RX ORDER — KETOROLAC TROMETHAMINE 30 MG/ML
30 INJECTION, SOLUTION INTRAMUSCULAR; INTRAVENOUS
Status: COMPLETED | OUTPATIENT
Start: 2017-07-17 | End: 2017-07-18

## 2017-07-17 RX ORDER — ONDANSETRON 2 MG/ML
8 INJECTION INTRAMUSCULAR; INTRAVENOUS
Status: DISCONTINUED | OUTPATIENT
Start: 2017-07-17 | End: 2017-07-18 | Stop reason: HOSPADM

## 2017-07-18 ENCOUNTER — APPOINTMENT (OUTPATIENT)
Dept: CT IMAGING | Age: 42
End: 2017-07-18
Attending: EMERGENCY MEDICINE
Payer: MEDICAID

## 2017-07-18 VITALS
TEMPERATURE: 97.4 F | SYSTOLIC BLOOD PRESSURE: 106 MMHG | BODY MASS INDEX: 18.69 KG/M2 | HEIGHT: 72 IN | OXYGEN SATURATION: 99 % | WEIGHT: 138 LBS | RESPIRATION RATE: 16 BRPM | DIASTOLIC BLOOD PRESSURE: 58 MMHG | HEART RATE: 61 BPM

## 2017-07-18 LAB
APPEARANCE UR: CLEAR
BACTERIA URNS QL MICRO: NEGATIVE /HPF
BASOPHILS # BLD AUTO: 0 K/UL (ref 0–0.1)
BASOPHILS # BLD: 0 % (ref 0–1)
BILIRUB UR QL CFM: NEGATIVE
COLOR UR: ABNORMAL
DIFFERENTIAL METHOD BLD: ABNORMAL
EOSINOPHIL # BLD: 0.1 K/UL (ref 0–0.4)
EOSINOPHIL NFR BLD: 1 % (ref 0–7)
EPITH CASTS URNS QL MICRO: ABNORMAL /LPF
ERYTHROCYTE [DISTWIDTH] IN BLOOD BY AUTOMATED COUNT: 19 % (ref 11.5–14.5)
GLUCOSE UR STRIP.AUTO-MCNC: NEGATIVE MG/DL
GRAN CASTS URNS QL MICRO: ABNORMAL /LPF
HCT VFR BLD AUTO: 30.5 % (ref 36.6–50.3)
HGB BLD-MCNC: 9.4 G/DL (ref 12.1–17)
HGB UR QL STRIP: NEGATIVE
KETONES UR QL STRIP.AUTO: ABNORMAL MG/DL
LEUKOCYTE ESTERASE UR QL STRIP.AUTO: NEGATIVE
LYMPHOCYTES # BLD AUTO: 26 % (ref 12–49)
LYMPHOCYTES # BLD: 1.9 K/UL (ref 0.8–3.5)
MCH RBC QN AUTO: 21.8 PG (ref 26–34)
MCHC RBC AUTO-ENTMCNC: 30.8 G/DL (ref 30–36.5)
MCV RBC AUTO: 70.6 FL (ref 80–99)
MONOCYTES # BLD: 0.6 K/UL (ref 0–1)
MONOCYTES NFR BLD AUTO: 8 % (ref 5–13)
MUCOUS THREADS URNS QL MICRO: ABNORMAL /LPF
NEUTS SEG # BLD: 4.6 K/UL (ref 1.8–8)
NEUTS SEG NFR BLD AUTO: 65 % (ref 32–75)
NITRITE UR QL STRIP.AUTO: NEGATIVE
PATH REV BLD -IMP: ABNORMAL
PH UR STRIP: 5.5 [PH] (ref 5–8)
PLATELET # BLD AUTO: 1132 K/UL (ref 150–400)
PROT UR STRIP-MCNC: ABNORMAL MG/DL
RBC # BLD AUTO: 4.32 M/UL (ref 4.1–5.7)
RBC #/AREA URNS HPF: ABNORMAL /HPF (ref 0–5)
RBC MORPH BLD: ABNORMAL
SP GR UR REFRACTOMETRY: 1.03 (ref 1–1.03)
UA: UC IF INDICATED,UAUC: ABNORMAL
UROBILINOGEN UR QL STRIP.AUTO: 1 EU/DL (ref 0.2–1)
WBC # BLD AUTO: 7.2 K/UL (ref 4.1–11.1)
WBC MORPH BLD: ABNORMAL
WBC URNS QL MICRO: ABNORMAL /HPF (ref 0–4)

## 2017-07-18 PROCEDURE — 74011000258 HC RX REV CODE- 258: Performed by: EMERGENCY MEDICINE

## 2017-07-18 PROCEDURE — 85025 COMPLETE CBC W/AUTO DIFF WBC: CPT | Performed by: EMERGENCY MEDICINE

## 2017-07-18 PROCEDURE — 74011636320 HC RX REV CODE- 636/320: Performed by: EMERGENCY MEDICINE

## 2017-07-18 PROCEDURE — 74011250636 HC RX REV CODE- 250/636: Performed by: EMERGENCY MEDICINE

## 2017-07-18 PROCEDURE — 74011250637 HC RX REV CODE- 250/637: Performed by: EMERGENCY MEDICINE

## 2017-07-18 PROCEDURE — 74177 CT ABD & PELVIS W/CONTRAST: CPT

## 2017-07-18 PROCEDURE — 81001 URINALYSIS AUTO W/SCOPE: CPT | Performed by: EMERGENCY MEDICINE

## 2017-07-18 PROCEDURE — 36415 COLL VENOUS BLD VENIPUNCTURE: CPT | Performed by: EMERGENCY MEDICINE

## 2017-07-18 RX ORDER — IBUPROFEN 400 MG/1
400 TABLET ORAL
Status: COMPLETED | OUTPATIENT
Start: 2017-07-18 | End: 2017-07-18

## 2017-07-18 RX ORDER — PREDNISONE 10 MG/1
60 TABLET ORAL DAILY
Qty: 111 TAB | Refills: 0 | Status: SHIPPED | OUTPATIENT
Start: 2017-07-18 | End: 2017-09-28

## 2017-07-18 RX ORDER — OXYCODONE HYDROCHLORIDE 5 MG/1
5 CAPSULE ORAL
Qty: 20 CAP | Refills: 0 | Status: SHIPPED | OUTPATIENT
Start: 2017-07-18 | End: 2017-09-28

## 2017-07-18 RX ORDER — SODIUM CHLORIDE 0.9 % (FLUSH) 0.9 %
10 SYRINGE (ML) INJECTION
Status: COMPLETED | OUTPATIENT
Start: 2017-07-18 | End: 2017-07-18

## 2017-07-18 RX ADMIN — KETOROLAC TROMETHAMINE 30 MG: 30 INJECTION, SOLUTION INTRAMUSCULAR at 00:19

## 2017-07-18 RX ADMIN — IBUPROFEN 400 MG: 400 TABLET, FILM COATED ORAL at 02:16

## 2017-07-18 RX ADMIN — SODIUM CHLORIDE 1000 ML: 900 INJECTION, SOLUTION INTRAVENOUS at 00:20

## 2017-07-18 RX ADMIN — Medication 10 ML: at 00:46

## 2017-07-18 RX ADMIN — METHYLPREDNISOLONE SODIUM SUCCINATE 40 MG: 40 INJECTION, POWDER, FOR SOLUTION INTRAMUSCULAR; INTRAVENOUS at 01:01

## 2017-07-18 RX ADMIN — IOPAMIDOL 100 ML: 755 INJECTION, SOLUTION INTRAVENOUS at 00:46

## 2017-07-18 RX ADMIN — HYDROMORPHONE HYDROCHLORIDE 1 MG: 1 INJECTION, SOLUTION INTRAMUSCULAR; INTRAVENOUS; SUBCUTANEOUS at 00:19

## 2017-07-18 RX ADMIN — SODIUM CHLORIDE 100 ML: 900 INJECTION, SOLUTION INTRAVENOUS at 00:46

## 2017-07-18 NOTE — ED NOTES
Patient received discharge instructions by MD and nurse. Patient verbalized understanding of medication use and other discharge instructions. Updated vitals, IV DC and patient ambulated out of ED with steady gait, showing no signs of distress. Pt reports relief of most intense pain. All questions answered. Patient will call roommate, Rianna Harp and or a cab. Pt will wait in waiting room.

## 2017-07-18 NOTE — DISCHARGE INSTRUCTIONS
We hope that we have addressed all of your medical concerns. The examination and treatment you received in the Emergency Department were for an emergent problem and were not intended as complete care. It is important that you follow up with your healthcare provider(s) for ongoing care. If your symptoms worsen or do not improve as expected, and you are unable to reach your usual health care provider(s), you should return to the Emergency Department. Today's healthcare is undergoing tremendous change, and patient satisfaction surveys are one of the many tools to assess the quality of medical care. You may receive a survey from the [employer] regarding your experience in the Emergency Department. I hope that your experience has been completely positive, particularly the medical care that I provided. As such, please participate in the survey; anything less than excellent does not meet my expectations or intentions. [facility] and 8 [facility] participate in nationally recognized quality of care measures. If your blood pressure is greater than 120/80, as reported below, we urge that you seek medical care to address the potential of high blood pressure, commonly known as hypertension. Hypertension can be hereditary or can be caused by certain medical conditions, pain, stress, or \"white coat syndrome. \"       Please make an appointment with your health care provider(s) for follow up of your Emergency Department visit. VITALS:   Patient Vitals for the past 8 hrs:   Temp Pulse Resp BP SpO2   07/18/17 0200 - - - 112/60 99 %   07/18/17 0029 98.7 °F (37.1 °C) 88 16 113/73 100 %   07/17/17 2043 98.1 °F (36.7 °C) 97 20 109/68 100 %          Thank you for allowing us to provide you with medical care today. We realize that you have many choices for your emergency care needs. Please choose us in the future for any continued health care needs. Arelia Dancer Donnise Maduro, MD    [address] [facility].   Office: [phone]            Recent Results (from the past 24 hour(s))   METABOLIC PANEL, COMPREHENSIVE    Collection Time: 07/17/17  9:43 PM   Result Value Ref Range    Sodium 136 136 - 145 mmol/L    Potassium 4.4 3.5 - 5.1 mmol/L    Chloride 99 97 - 108 mmol/L    CO2 26 21 - 32 mmol/L    Anion gap 11 5 - 15 mmol/L    Glucose 125 (H) 65 - 100 mg/dL    BUN 9 6 - 20 MG/DL    Creatinine 0.93 0.70 - 1.30 MG/DL    BUN/Creatinine ratio 10 (L) 12 - 20      GFR est AA >60 >60 ml/min/1.73m2    GFR est non-AA >60 >60 ml/min/1.73m2    Calcium 9.6 8.5 - 10.1 MG/DL    Bilirubin, total 0.2 0.2 - 1.0 MG/DL    ALT (SGPT) 13 12 - 78 U/L    AST (SGOT) 24 15 - 37 U/L    Alk. phosphatase 84 45 - 117 U/L    Protein, total 7.9 6.4 - 8.2 g/dL    Albumin 2.9 (L) 3.5 - 5.0 g/dL    Globulin 5.0 (H) 2.0 - 4.0 g/dL    A-G Ratio 0.6 (L) 1.1 - 2.2     LIPASE    Collection Time: 07/17/17  9:43 PM   Result Value Ref Range    Lipase 83 73 - 393 U/L   CBC WITH AUTOMATED DIFF    Collection Time: 07/18/17 12:10 AM   Result Value Ref Range    WBC 7.2 4.1 - 11.1 K/uL    RBC 4.32 4.10 - 5.70 M/uL    HGB 9.4 (L) 12.1 - 17.0 g/dL    HCT 30.5 (L) 36.6 - 50.3 %    MCV 70.6 (L) 80.0 - 99.0 FL    MCH 21.8 (L) 26.0 - 34.0 PG    MCHC 30.8 30.0 - 36.5 g/dL    RDW 19.0 (H) 11.5 - 14.5 %    PLATELET 7058 (HH) 120 - 400 K/uL    NEUTROPHILS 65 32 - 75 %    LYMPHOCYTES 26 12 - 49 %    MONOCYTES 8 5 - 13 %    EOSINOPHILS 1 0 - 7 %    BASOPHILS 0 0 - 1 %    ABS. NEUTROPHILS 4.6 1.8 - 8.0 K/UL    ABS. LYMPHOCYTES 1.9 0.8 - 3.5 K/UL    ABS. MONOCYTES 0.6 0.0 - 1.0 K/UL    ABS. EOSINOPHILS 0.1 0.0 - 0.4 K/UL    ABS.  BASOPHILS 0.0 0.0 - 0.1 K/UL    DF SMEAR SCANNED      RBC COMMENTS MICROCYTOSIS  2+        RBC COMMENTS HYPOCHROMIA  2+        RBC COMMENTS SCHISTOCYTES  PRESENT        RBC COMMENTS TARGET CELLS  PRESENT        RBC COMMENTS POLYCHROMASIA  PRESENT        RBC COMMENTS ANISOCYTOSIS  2+        WBC COMMENTS Pathology Review Requested     URINALYSIS W/ REFLEX CULTURE    Collection Time: 07/18/17 12:10 AM   Result Value Ref Range    Color YELLOW/STRAW      Appearance CLEAR CLEAR      Specific gravity 1.030 1.003 - 1.030      pH (UA) 5.5 5.0 - 8.0      Protein TRACE (A) NEG mg/dL    Glucose NEGATIVE  NEG mg/dL    Ketone TRACE (A) NEG mg/dL    Blood NEGATIVE  NEG      Urobilinogen 1.0 0.2 - 1.0 EU/dL    Nitrites NEGATIVE  NEG      Leukocyte Esterase NEGATIVE  NEG      WBC 0-4 0 - 4 /hpf    RBC 0-5 0 - 5 /hpf    Epithelial cells FEW FEW /lpf    Bacteria NEGATIVE  NEG /hpf    UA:UC IF INDICATED CULTURE NOT INDICATED BY UA RESULT CNI      Mucus 1+ (A) NEG /lpf    Granular cast 0-2 (A) NEG /lpf   BILIRUBIN, CONFIRM    Collection Time: 07/18/17 12:10 AM   Result Value Ref Range    Bilirubin UA, confirm NEGATIVE  NEG         Ct Abd Pelv W Cont    Result Date: 7/18/2017  EXAM:  CT ABD PELV W CONT INDICATION: abd pain COMPARISON: 4/29/2017 CONTRAST:  100 mL of Isovue-370. TECHNIQUE: Following the uneventful intravenous administration of contrast, thin axial images were obtained through the abdomen and pelvis. Coronal and sagittal reconstructions were generated. Oral contrast was not administered. CT dose reduction was achieved through use of a standardized protocol tailored for this examination and automatic exposure control for dose modulation. FINDINGS: LUNG BASES: Clear. INCIDENTALLY IMAGED HEART AND MEDIASTINUM: Unremarkable. LIVER: No mass or biliary dilatation. GALLBLADDER: Unremarkable. SPLEEN: No mass. PANCREAS: No mass or ductal dilatation. ADRENALS: Unremarkable. KIDNEYS: No mass, calculus, or hydronephrosis. STOMACH: Unremarkable. SMALL BOWEL: No dilatation or wall thickening. COLON: There is moderate distention of the right colon and the transverse colon. There is wall thickening in the splenic flexure distal to this.  There is a small fluid collection in the wall of the splenic flexure measuring 10 mm on coronal image 32. This connects to a complex area beneath the splenic flexure best seen on image 33. There is a small portion extending down towards the small bowel more inferiorly. APPENDIX: Unremarkable. PERITONEUM: No ascites or pneumoperitoneum. RETROPERITONEUM: No lymphadenopathy or aortic aneurysm. REPRODUCTIVE ORGANS: The prostate and seminal vesicles appear unremarkable URINARY BLADDER: No mass or calculus. BONES: No destructive bone lesion. ADDITIONAL COMMENTS: N/A     IMPRESSION: Significant wall thickening in the splenic flexure with an adjacent fistula extending inferiorly. There is partial obstruction of the more proximal colon with a large amount of stool present. While this could be inflammatory or infectious, malignancy cannot be excluded. The wall thickening is relatively unchanged. The fistula appears new. The previously seen ascites has resolved. Crohn's Disease: Care Instructions  Your Care Instructions    Crohn's disease is a lifelong inflammatory bowel disease (IBD). Parts of the digestive tract get swollen and irritated and may develop deep sores called ulcers. Crohn's disease usually occurs in the last part of the small intestine and the first part of the large intestine. But it can develop anywhere from the mouth to the anus. The main symptoms of Crohn's disease are belly pain, diarrhea, fever, and weight loss. Some people may have constipation. Crohn's disease also sometimes causes problems with the joints, eyes, or skin. Your symptoms may be mild at some times and severe at others. The disease can also go into remission, which means that it is not active and you have no symptoms. Bad attacks of Crohn's disease often have to be treated in the hospital so that you can get medicines and liquids through a tube in your vein, called an IV. This gives your digestive system time to rest and recover. Talk with your doctor about the best treatments for you.  You may need medicines that help prevent or treat flare-ups of the disease. You may need surgery to remove part of your bowel if you have an abnormal opening in the bowel (fistula), an abscess, or a bowel obstruction. In some cases, surgery is needed if medicines do not work. But symptoms often return to other areas of the intestines after surgery. Learning good self-care can help you reduce your symptoms and manage Crohn's disease. Follow-up care is a key part of your treatment and safety. Be sure to make and go to all appointments, and call your doctor if you are having problems. Its also a good idea to know your test results and keep a list of the medicines you take. How can you care for yourself at home? · Take your medicines exactly as prescribed. Call your doctor if you think you are having a problem with your medicine. You will get more details on the specific medicines your doctor prescribes. · Do not take anti-inflammatory medicines, such as aspirin, ibuprofen (Advil, Motrin), or naproxen (Aleve). They may make your symptoms worse. Do not take any other medicines or herbal products without talking to your doctor first.  · Avoid foods that make your symptoms worse. These might include milk, alcohol, high-fiber foods, or spicy foods. · Eat a healthy diet. Make sure to get enough iron. Rectal bleeding may make you lose iron. Good sources of iron include beef, lentils, spinach, raisins, and iron-enriched breads and cereals. · Drink liquid meal replacements if your doctor recommends them. These are high in calories and contain vitamins and minerals. Severe symptoms may make it hard for your body to absorb vitamins and minerals. · Do not smoke. Smoking makes Crohn's disease worse. If you need help quitting, talk to your doctor about stop-smoking programs and medicines. These can increase your chances of quitting for good. · Seek support from friends and family to help cope with Crohn's disease.  The illness can affect all parts of your life. Get counseling if you need it. When should you call for help? Call 911 anytime you think you may need emergency care. For example, call if:  · You have severe belly pain. · You passed out (lost consciousness). Call your doctor now or seek immediate medical care if:  · You have signs of needing more fluids. You have sunken eyes and a dry mouth, and you pass only a little dark urine. · You have pain and swelling in the anal area, or you have pus draining from the anal area. · You have a fever or shaking chills. · Your belly is bloated. Watch closely for changes in your health, and be sure to contact your doctor if:  · Your symptoms get worse. · You have diarrhea for more than 2 weeks. · Your pain is not steadily getting better. · You have unexplained weight loss. Where can you learn more? Go to http://luis-timbo.info/. Enter 21 [phone] in the search box to learn more about \"Crohn's Disease: Care Instructions. \"  Current as of: August 9, 2016  Content Version: 11.3  © 0108-7344 [employer]. Care instructions adapted under license by [employer] (which disclaims liability or warranty for this information). If you have questions about a medical condition or this instruction, always ask your healthcare professional. [address] any warranty or liability for your use of this information.
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
## 2017-07-18 NOTE — ED NOTES
Verbal shift change report given to Angelica Danielle RN (oncoming nurse) by Cynthia Loo RN (offgoing nurse). Report included the following information SBAR, ED Summary, MAR and Recent Results.

## 2017-07-18 NOTE — ED NOTES
Received bedside report in SBAR format, updated on [facility], recent results and plan of care from [place], [facility]. Assumed care of patient.

## 2017-07-18 NOTE — ED TRIAGE NOTES
Patient presents to the emergency department reporting history of Crohn's. Patient reports he cannot afford his medications, and came because he needs the Crohn's medications. Patient reports all over abdominal pain. Patient arrived with large bag of food. Patient states steroids worked well for him. Patient states he has not had as much diarrhea as when he was diagnosed.

## 2017-09-28 ENCOUNTER — APPOINTMENT (OUTPATIENT)
Dept: CT IMAGING | Age: 42
DRG: 386 | End: 2017-09-28
Attending: NURSE PRACTITIONER
Payer: MEDICAID

## 2017-09-28 ENCOUNTER — HOSPITAL ENCOUNTER (INPATIENT)
Age: 42
LOS: 5 days | Discharge: HOME | DRG: 386 | End: 2017-10-03
Attending: EMERGENCY MEDICINE | Admitting: FAMILY MEDICINE
Payer: MEDICAID

## 2017-09-28 DIAGNOSIS — D64.9 ANEMIA, UNSPECIFIED TYPE: Primary | ICD-10-CM

## 2017-09-28 PROBLEM — K92.2 GI BLEED: Status: ACTIVE | Noted: 2017-09-28

## 2017-09-28 PROBLEM — D62 ACUTE BLOOD LOSS ANEMIA: Status: ACTIVE | Noted: 2017-09-28

## 2017-09-28 LAB
ALBUMIN SERPL-MCNC: 2.7 G/DL (ref 3.5–5)
ALBUMIN/GLOB SERPL: 0.6 {RATIO} (ref 1.1–2.2)
ALP SERPL-CCNC: 68 U/L (ref 45–117)
ALT SERPL-CCNC: 10 U/L (ref 12–78)
ANION GAP SERPL CALC-SCNC: 10 MMOL/L (ref 5–15)
AST SERPL-CCNC: 9 U/L (ref 15–37)
BASOPHILS # BLD: 0 K/UL (ref 0–0.1)
BASOPHILS NFR BLD: 0 % (ref 0–1)
BILIRUB SERPL-MCNC: <0.1 MG/DL (ref 0.2–1)
BUN SERPL-MCNC: 12 MG/DL (ref 6–20)
BUN/CREAT SERPL: 16 (ref 12–20)
CALCIUM SERPL-MCNC: 8.5 MG/DL (ref 8.5–10.1)
CHLORIDE SERPL-SCNC: 107 MMOL/L (ref 97–108)
CO2 SERPL-SCNC: 23 MMOL/L (ref 21–32)
CREAT SERPL-MCNC: 0.76 MG/DL (ref 0.7–1.3)
DIFFERENTIAL METHOD BLD: ABNORMAL
EOSINOPHIL # BLD: 0.2 K/UL (ref 0–0.4)
EOSINOPHIL NFR BLD: 2 % (ref 0–7)
ERYTHROCYTE [DISTWIDTH] IN BLOOD BY AUTOMATED COUNT: 20.5 % (ref 11.5–14.5)
GLOBULIN SER CALC-MCNC: 4.5 G/DL (ref 2–4)
GLUCOSE SERPL-MCNC: 80 MG/DL (ref 65–100)
HCT VFR BLD AUTO: 12.7 % (ref 36.6–50.3)
HEMOCCULT STL QL: POSITIVE
HGB BLD-MCNC: 3.4 G/DL (ref 12.1–17)
HGB BLD-MCNC: 4.1 G/DL (ref 12.1–17)
INR PPP: 1 (ref 0.9–1.1)
LIPASE SERPL-CCNC: 132 U/L (ref 73–393)
LYMPHOCYTES # BLD: 1.8 K/UL (ref 0.8–3.5)
LYMPHOCYTES NFR BLD: 24 % (ref 12–49)
MCH RBC QN AUTO: 14.4 PG (ref 26–34)
MCHC RBC AUTO-ENTMCNC: 26.8 G/DL (ref 30–36.5)
MCV RBC AUTO: 53.8 FL (ref 80–99)
MONOCYTES # BLD: 0.7 K/UL (ref 0–1)
MONOCYTES NFR BLD: 9 % (ref 5–13)
NEUTS SEG # BLD: 4.8 K/UL (ref 1.8–8)
NEUTS SEG NFR BLD: 65 % (ref 32–75)
PLATELET # BLD AUTO: 1115 K/UL (ref 150–400)
PLATELET COMMENTS,PCOM: ABNORMAL
POTASSIUM SERPL-SCNC: 3.5 MMOL/L (ref 3.5–5.1)
PROT SERPL-MCNC: 7.2 G/DL (ref 6.4–8.2)
PROTHROMBIN TIME: 10.4 SEC (ref 9–11.1)
RBC # BLD AUTO: 2.36 M/UL (ref 4.1–5.7)
RBC MORPH BLD: ABNORMAL
SODIUM SERPL-SCNC: 140 MMOL/L (ref 136–145)
TROPONIN I SERPL-MCNC: <0.04 NG/ML
WBC # BLD AUTO: 7.5 K/UL (ref 4.1–11.1)

## 2017-09-28 PROCEDURE — 74011250636 HC RX REV CODE- 250/636: Performed by: NURSE PRACTITIONER

## 2017-09-28 PROCEDURE — 74011250637 HC RX REV CODE- 250/637: Performed by: FAMILY MEDICINE

## 2017-09-28 PROCEDURE — 85025 COMPLETE CBC W/AUTO DIFF WBC: CPT | Performed by: NURSE PRACTITIONER

## 2017-09-28 PROCEDURE — 99285 EMERGENCY DEPT VISIT HI MDM: CPT

## 2017-09-28 PROCEDURE — 84484 ASSAY OF TROPONIN QUANT: CPT | Performed by: NURSE PRACTITIONER

## 2017-09-28 PROCEDURE — 83690 ASSAY OF LIPASE: CPT | Performed by: NURSE PRACTITIONER

## 2017-09-28 PROCEDURE — 85610 PROTHROMBIN TIME: CPT | Performed by: FAMILY MEDICINE

## 2017-09-28 PROCEDURE — 96374 THER/PROPH/DIAG INJ IV PUSH: CPT

## 2017-09-28 PROCEDURE — 74011000258 HC RX REV CODE- 258: Performed by: EMERGENCY MEDICINE

## 2017-09-28 PROCEDURE — 82272 OCCULT BLD FECES 1-3 TESTS: CPT | Performed by: NURSE PRACTITIONER

## 2017-09-28 PROCEDURE — 65610000006 HC RM INTENSIVE CARE

## 2017-09-28 PROCEDURE — P9016 RBC LEUKOCYTES REDUCED: HCPCS | Performed by: NURSE PRACTITIONER

## 2017-09-28 PROCEDURE — 86923 COMPATIBILITY TEST ELECTRIC: CPT | Performed by: NURSE PRACTITIONER

## 2017-09-28 PROCEDURE — 77030013169 SET IV BLD ICUM -A

## 2017-09-28 PROCEDURE — 74011000250 HC RX REV CODE- 250: Performed by: FAMILY MEDICINE

## 2017-09-28 PROCEDURE — 93005 ELECTROCARDIOGRAM TRACING: CPT

## 2017-09-28 PROCEDURE — C9113 INJ PANTOPRAZOLE SODIUM, VIA: HCPCS | Performed by: FAMILY MEDICINE

## 2017-09-28 PROCEDURE — 80053 COMPREHEN METABOLIC PANEL: CPT | Performed by: NURSE PRACTITIONER

## 2017-09-28 PROCEDURE — 86901 BLOOD TYPING SEROLOGIC RH(D): CPT | Performed by: NURSE PRACTITIONER

## 2017-09-28 PROCEDURE — 74011250636 HC RX REV CODE- 250/636: Performed by: FAMILY MEDICINE

## 2017-09-28 PROCEDURE — 74177 CT ABD & PELVIS W/CONTRAST: CPT

## 2017-09-28 PROCEDURE — 30233N1 TRANSFUSION OF NONAUTOLOGOUS RED BLOOD CELLS INTO PERIPHERAL VEIN, PERCUTANEOUS APPROACH: ICD-10-PCS | Performed by: INTERNAL MEDICINE

## 2017-09-28 PROCEDURE — 74011636320 HC RX REV CODE- 636/320: Performed by: EMERGENCY MEDICINE

## 2017-09-28 PROCEDURE — 36430 TRANSFUSION BLD/BLD COMPNT: CPT

## 2017-09-28 PROCEDURE — 36415 COLL VENOUS BLD VENIPUNCTURE: CPT | Performed by: FAMILY MEDICINE

## 2017-09-28 PROCEDURE — 85018 HEMOGLOBIN: CPT | Performed by: FAMILY MEDICINE

## 2017-09-28 RX ORDER — MESALAMINE 800 MG/1
800 TABLET, DELAYED RELEASE ORAL 3 TIMES DAILY
COMMUNITY

## 2017-09-28 RX ORDER — MORPHINE SULFATE 2 MG/ML
2 INJECTION, SOLUTION INTRAMUSCULAR; INTRAVENOUS ONCE
Status: COMPLETED | OUTPATIENT
Start: 2017-09-28 | End: 2017-09-28

## 2017-09-28 RX ORDER — SODIUM CHLORIDE 0.9 % (FLUSH) 0.9 %
5-10 SYRINGE (ML) INJECTION AS NEEDED
Status: DISCONTINUED | OUTPATIENT
Start: 2017-09-28 | End: 2017-10-03 | Stop reason: HOSPADM

## 2017-09-28 RX ORDER — SODIUM CHLORIDE 9 MG/ML
75 INJECTION, SOLUTION INTRAVENOUS CONTINUOUS
Status: DISCONTINUED | OUTPATIENT
Start: 2017-09-28 | End: 2017-09-29

## 2017-09-28 RX ORDER — DIAZEPAM 10 MG/2ML
5 INJECTION INTRAMUSCULAR
Status: DISCONTINUED | OUTPATIENT
Start: 2017-09-28 | End: 2017-10-03 | Stop reason: HOSPADM

## 2017-09-28 RX ORDER — SODIUM CHLORIDE 0.9 % (FLUSH) 0.9 %
10 SYRINGE (ML) INJECTION
Status: COMPLETED | OUTPATIENT
Start: 2017-09-28 | End: 2017-09-28

## 2017-09-28 RX ORDER — METRONIDAZOLE 500 MG/100ML
500 INJECTION, SOLUTION INTRAVENOUS EVERY 8 HOURS
Status: DISCONTINUED | OUTPATIENT
Start: 2017-09-28 | End: 2017-10-03

## 2017-09-28 RX ORDER — HYDROMORPHONE HYDROCHLORIDE 1 MG/ML
1 INJECTION, SOLUTION INTRAMUSCULAR; INTRAVENOUS; SUBCUTANEOUS
Status: DISCONTINUED | OUTPATIENT
Start: 2017-09-28 | End: 2017-10-03

## 2017-09-28 RX ORDER — SODIUM CHLORIDE 0.9 % (FLUSH) 0.9 %
5-10 SYRINGE (ML) INJECTION EVERY 8 HOURS
Status: DISCONTINUED | OUTPATIENT
Start: 2017-09-28 | End: 2017-10-03 | Stop reason: HOSPADM

## 2017-09-28 RX ORDER — OXYCODONE HYDROCHLORIDE 10 MG/1
10 TABLET ORAL
COMMUNITY
End: 2018-01-26

## 2017-09-28 RX ORDER — SODIUM CHLORIDE 9 MG/ML
75 INJECTION, SOLUTION INTRAVENOUS CONTINUOUS
Status: DISCONTINUED | OUTPATIENT
Start: 2017-09-28 | End: 2017-10-03

## 2017-09-28 RX ORDER — LEVOFLOXACIN 5 MG/ML
750 INJECTION, SOLUTION INTRAVENOUS EVERY 24 HOURS
Status: DISCONTINUED | OUTPATIENT
Start: 2017-09-28 | End: 2017-10-03

## 2017-09-28 RX ORDER — MESALAMINE 800 MG/1
800 TABLET, DELAYED RELEASE ORAL
Status: DISCONTINUED | OUTPATIENT
Start: 2017-09-28 | End: 2017-10-03 | Stop reason: HOSPADM

## 2017-09-28 RX ORDER — SODIUM CHLORIDE 9 MG/ML
250 INJECTION, SOLUTION INTRAVENOUS AS NEEDED
Status: DISCONTINUED | OUTPATIENT
Start: 2017-09-28 | End: 2017-10-03 | Stop reason: HOSPADM

## 2017-09-28 RX ORDER — ONDANSETRON 2 MG/ML
4 INJECTION INTRAMUSCULAR; INTRAVENOUS
Status: DISCONTINUED | OUTPATIENT
Start: 2017-09-28 | End: 2017-10-03 | Stop reason: HOSPADM

## 2017-09-28 RX ADMIN — SODIUM CHLORIDE 75 ML/HR: 900 INJECTION, SOLUTION INTRAVENOUS at 23:55

## 2017-09-28 RX ADMIN — IOPAMIDOL 100 ML: 755 INJECTION, SOLUTION INTRAVENOUS at 18:00

## 2017-09-28 RX ADMIN — Medication 10 ML: at 18:00

## 2017-09-28 RX ADMIN — MESALAMINE 800 MG: 800 TABLET, DELAYED RELEASE ORAL at 19:26

## 2017-09-28 RX ADMIN — METRONIDAZOLE 500 MG: 500 INJECTION, SOLUTION INTRAVENOUS at 23:55

## 2017-09-28 RX ADMIN — HYDROMORPHONE HYDROCHLORIDE 1 MG: 1 INJECTION, SOLUTION INTRAMUSCULAR; INTRAVENOUS; SUBCUTANEOUS at 23:45

## 2017-09-28 RX ADMIN — MORPHINE SULFATE 2 MG: 2 INJECTION, SOLUTION INTRAMUSCULAR; INTRAVENOUS at 16:34

## 2017-09-28 RX ADMIN — SODIUM CHLORIDE 100 ML: 900 INJECTION, SOLUTION INTRAVENOUS at 18:00

## 2017-09-28 RX ADMIN — SODIUM CHLORIDE 40 MG: 9 INJECTION INTRAMUSCULAR; INTRAVENOUS; SUBCUTANEOUS at 23:26

## 2017-09-28 RX ADMIN — FOLIC ACID: 5 INJECTION, SOLUTION INTRAMUSCULAR; INTRAVENOUS; SUBCUTANEOUS at 18:24

## 2017-09-28 RX ADMIN — HYDROMORPHONE HYDROCHLORIDE 1 MG: 1 INJECTION, SOLUTION INTRAMUSCULAR; INTRAVENOUS; SUBCUTANEOUS at 18:24

## 2017-09-28 RX ADMIN — Medication 10 ML: at 23:24

## 2017-09-28 NOTE — ED TRIAGE NOTES
Pt. ambulatory with EMS for abd. pain. Recently diagnosed with Crohn's. Given Rx by GI MD and \"the medication isn't helping\". Denies n/v/d. Last BM 4 days ago.

## 2017-09-28 NOTE — H&P
[address] [facility] [place] [address]   HISTORY AND PHYSICAL       Name:  Conchis Ruiz   MR#:  [id number]   :  1975   Account #:  [de-identified]        Date of Adm:  2017       ATTENDING PHYSICIAN: J Luis Salamanca MD    CHIEF COMPLAINT: Abdominal pain and weakness. HISTORY OF PRESENT ILLNESS: The patient is a 71-year-old   gentleman with past medical history of inflammatory bowel disease,   history of alcohol abuse, history of tobacco abuse, who presents to the   hospital with the above mentioned symptoms. The patient reports that   he had similar symptoms earlier this year, was seen in this hospital,   was prescribed steroids as well as mesalamine. The patient reports   that he did take steroids for a few days, but then something \"weird   started happening down there, I do not want to talk about that,\" and   thus the patient stopped taking the steroids. Regardless, continued to   take mesalamine but reports for the past month or so started getting   more abdominal pain associated with some nausea, but no vomiting. The patient reports that he feels extremely fatigued, feels tired all the   time, had some nausea associated with the symptoms and also has   constant belly pain. The patient reports that the belly pain is spasmodic   and it comes and goes. He came to the ER today and was found to   have a hemoglobin of 3.4. The patient also was found to have stool   positive for blood, and was requested to be admitted under the   hospitalist service. The patient reports that he used to have black   stools, but then he started taking \"herbal supplementation from [facility], of which I do not remember the name,\" and the black stools   stopped. The patient currently reports that he has brown stools. The   patient denies any hematemesis, fever, chills, urinary symptoms or any   other concerns or problems.  The patient denies any headache, blurry   vision, sore throat, trouble swallowing, trouble with speech, any falls,   injuries, lightheadedness, chest pain, shortness of breath, cough, focal   or generalized neurological weakness, any rashes, recent travel, sick   contacts, hematuria, hemoptysis, hematemesis or any other concerns   or problems. PAST MEDICAL HISTORY: See above. HOME MEDICATIONS   1. Vitamin B12.   2. Cholecalciferol. 3. Mesalamine 800 mg t.i.d.   4. Oxycodone. 5. Omega 3 fish oil    SOCIAL HISTORY: Smokes half a pack per day for 20 years. Drinks 2   beers per day. Smokes marijuana occasionally, last use was   yesterday. ALLERGIES: NO KNOWN DRUG ALLERGIES. FAMILY HISTORY: Was discussed, was found to be noncontributory. PHYSICAL EXAMINATION   VITAL SIGNS: Temperature 97.8, pulse 79, respiratory rate 10, blood   pressure 96/56, pulse oximetry 100% room air. GENERAL: Alert x3, awake, mildly distressed, pleasant male, appears   to be stated age. HEENT: Pupils equal and reactive to light. Dry mucous membranes. Tympanic membranes clear. NECK: Supple. CHEST: Clear to auscultation bilaterally. CORONARY: S1, S2 were heard. ABDOMEN: Soft, nontender, nondistended. Bowel sounds are   physiological.   EXTREMITIES: No clubbing, no cyanosis, no edema. NEUROPSYCHIATRIC: Pleasant mood and affect. Cranial nerves 2-  12 grossly intact. Sensory grossly within normal limits. DTR 2+. Strength 5/5. SKIN: Warm. LABORATORY DATA: White count 7.5, hemoglobin 3.4, hematocrit   12.7, platelets 6.516 million. Sodium 140, potassium 3.5, chloride 107,   bicarbonate 23, BUN 12, creatinine 0.76, calcium 8.5, ALT 10, AST 9,   alkaline phosphatase 68, protein 7.2. CT of the abdomen and pelvis has been requested. ASSESSMENT AND PLAN   1. Acute blood loss anemia, most likely secondary to inflammatory   bowel disease resulting in weakness. The patient will be admitted to   the intensive care unit. Will transfuse 2 units PRBC.  Will provide gentle   IV hydration. Keep n.p.o. for now. I spoke with Dr. Aliza Jarrell from   Gastroenterology, who recommends starting the patient on Solu-  Medrol, Levaquin and Flagyl, and keeping n.p.o. Will continue Asacol,   will provide pain control, and will continue to monitor. CT of the   abdomen and pelvis is pending. Further intervention will be per   hospital course. Reassess as needed. The patient may need colorectal   surgery consult. Will defer to GI. Continue to monitor. Will monitor   hemoglobin and hematocrit every 8 hours and transfuse further as   needed. Further intervention will be per hospital course. 2. History of marijuana abuse. The patient was counseled. 3. History of tobacco abuse. Nicotine patch offered. 4. History of alcohol abuse. The patient will be on CIWA protocol. 5. Gastrointestinal and deep venous thrombosis prophylaxis. The   patient will be on sequential compression devices.         MD Thomas Valladares / Veda Morrison   D:  09/28/2017   17:56   T:  09/28/2017   19:00   Job #:  [id number]

## 2017-09-28 NOTE — IP AVS SNAPSHOT
[address] 
[phone] Patient: Brunilda Lowery MRN: [id number] NMI:6/98/1630 You are allergic to the following No active allergies Immunizations Administered for This Admission Name Date  
 TB Skin Test (PPD) Intradermal  Deferred (), 9/29/2017 Recent Documentation Height Weight BMI Smoking Status 1.829 m 58.3 kg 17.43 kg/m2 Current Every Day Smoker Unresulted Labs Order Current Status SAMPLE TO BLOOD BANK In process Emergency Contacts  (Rel.) Home Phone Work Phone Mobile Phone Claudia Chopra [phone] -- -- About your hospitalization You were admitted on:  September 28, 2017 You last received care in the:  [facility] You were discharged on:  October 3, 2017 Why you were hospitalized Your primary diagnosis was:  Acute Blood Loss Anemia Your diagnoses also included:  Gi Bleed, Crohn Disease (Hcc) Providers Seen During Your Hospitalizations Provider Role Specialty Primary office phone Jose Desai MD Attending Provider Emergency Medicine [phone] Wellington Abraham MD Attending Provider Hospitalist [phone] Cipriano Joy MD Attending Provider Internal Medicine [phone] Donna Sainz MD Attending Provider Internal Medicine [phone] Your Primary Care Physician (PCP) Primary Care Physician Office Phone Office Fax 1200 [facility] [phone] Follow-up Information Follow up With Details Comments Contact Info None   None (395) Patient stated that they have no PCP Harriett Pastor MD  Follow up as scheduled on 10/11.  [address] [facility] [address] [place] [address] 
[phone] [place] [facility] Go on 10/11/2017 Primary Care Physician:  Nargis Norton NP at 2:40 pm 200 [facility],  [address] [address], 
[address] [place] [address] 
[phone] Appointments for Next 14 days 10/11/2017  2:40 PM  2025 [facility] Internal Medicine Current Discharge Medication List  
  
START taking these medications Dose & Instructions Dispensing Information Comments Morning Noon Evening Bedtime  
 ferrous sulfate 142 mg (45 mg iron) ER tablet Commonly known as:  SLOW RELEASE IRON Your last dose was: Your next dose is:    
   
   
 Dose:  142 mg Take 1 Tab by mouth two (2) times a day. Take with meals  Indications: IRON DEFICIENCY ANEMIA Quantity:  60 Tab Refills:  0  
     
   
   
   
  
 predniSONE 10 mg tablet Commonly known as:  Brooks Gelineau Your last dose was: Your next dose is:    
   
   
 60 mg daily (6 tabs) x 7 days then 50mg(5 tabs) daily  x7 days then 40mg (4 tabs)daily x 7 days then 30mg (3 tabs) daily x 7 days then 20mg (2 tabs) daily x7 days then 10mg (1 tab) daily x7 days Quantity:  147 Tab Refills:  0 CONTINUE these medications which have CHANGED Dose & Instructions Dispensing Information Comments Morning Noon Evening Bedtime  
 cholecalciferol (vitamin D3) 4,000 unit Cap What changed:  Another medication with the same name was removed. Continue taking this medication, and follow the directions you see here. Your last dose was: Your next dose is:    
   
   
 Dose:  4000 Units Take 4,000 Units by mouth daily. Refills:  0  
     
   
   
   
  
 mesalamine  mg DR tablet Commonly known as:  ASACOL HD What changed:  Another medication with the same name was removed. Continue taking this medication, and follow the directions you see here. Your last dose was: Your next dose is:    
   
   
 Dose:  800 mg Take 800 mg by mouth three (3) times daily. Indications: CROHN'S DISEASE Refills:  0 CONTINUE these medications which have NOT CHANGED Dose & Instructions Dispensing Information Comments Morning Noon Evening Bedtime FISH -1,000 mg capsule Generic drug:  fish oil-omega-3 fatty acids Your last dose was: Your next dose is:    
   
   
 Dose:  1 Cap Take 1 Cap by mouth daily. Refills:  0  
     
   
   
   
  
 oxyCODONE IR 10 mg Tab immediate release tablet Commonly known as:  Corrinne Mitten Your last dose was: Your next dose is:    
   
   
 Dose:  10 mg Take 10 mg by mouth every eight (8) hours as needed. Refills:  0  
     
   
   
   
  
 VITAMIN B-12 PO Your last dose was: Your next dose is: Take  by mouth daily. Refills:  0 Where to Get Your Medications These medications were sent to [id number]- [facility], [address] [address] Hours:  24-hours Phone:  [phone]  
  ferrous sulfate 142 mg (45 mg iron) ER tablet Information on where to get these meds will be given to you by the nurse or doctor. ! Ask your nurse or doctor about these medications  
  predniSONE 10 mg tablet Discharge Instructions Discharge Instructions PATIENT ID: Francine Adamson MRN: [id number] YOB: 1975 DATE OF ADMISSION: 9/28/2017  2:20 PM   
DATE OF DISCHARGE: 10/3/2017 PRIMARY CARE PROVIDER: None ATTENDING PHYSICIAN: Rolando Calloway MD 
DISCHARGING PROVIDER: Sherren Craven, NP To contact this individual call [phone] and ask the  to page. If unavailable ask to be transferred the Adult Hospitalist Department. DISCHARGE DIAGNOSES Anemia, Crohn's exacerbation CONSULTATIONS: IP CONSULT TO HOSPITALIST 
IP CONSULT TO GASTROENTEROLOGY 
IP CONSULT TO GENERAL SURGERY 
 
PROCEDURES/SURGERIES: Procedure(s): 
 ESOPHAGOGASTRODUODENOSCOPY (EGD) PENDING TEST RESULTS:  
At the time of discharge the following test results are still pending: none FOLLOW UP APPOINTMENTS:  
Follow-up Information Follow up With Details Comments Contact Info None   None (395) Patient stated that they have no PCP Gill Manning MD  Follow up as scheduled on 10/11.  [address] [place] [address] [address] [place] [address] 
[phone] ADDITIONAL CARE RECOMMENDATIONS:  
-Take steroid taper as directed. -Start taking iron supplement twice daily for your anemia. Make sure to take with food to prevent stomach upset. -Recheck your labs (hemoglobin) either with pcp or Dr Faye Forrest within 1-2 weeks 
-Make sure to follow up with Dr Faye Forrest as scheduled on 10/11/2017. DIET: Crohns diet ACTIVITY: as tolerated WOUND CARE: none EQUIPMENT needed: none DISCHARGE MEDICATIONS: 
 See Medication Reconciliation Form · It is important that you take the medication exactly as they are prescribed. · Keep your medication in the bottles provided by the pharmacist and keep a list of the medication names, dosages, and times to be taken in your wallet. · Do not take other medications without consulting your doctor. NOTIFY YOUR PHYSICIAN FOR ANY OF THE FOLLOWING:  
Fever over 101 degrees for 24 hours. Chest pain, shortness of breath, fever, chills, nausea, vomiting, diarrhea, change in mentation, falling, weakness, bleeding. Severe pain or pain not relieved by medications. Or, any other signs or symptoms that you may have questions about. DISPOSITION: 
 X Home With: 
 OT  PT  [facility]  RN  
  
 SNF/Inpatient Rehab/LTAC Independent/assisted living Hospice Other: CDMP Checked: Yes X PROBLEM LIST Updated: Yes X Signed:  
Vishal Baker NP 
10/3/2017 12:49 PM 
 
Discharge Orders None MyChart Announcement  We are excited to announce that we are making your provider's discharge notes available to you in Ze[employer]. You will see these notes when they are completed and signed by the physician that discharged you from your recent hospital stay. If you have any questions or concerns about any information you see in Zeal[employer], please call the Health Information Department where you were seen or reach out to your Primary Care Provider for more information about your plan of care. Introducing [facility]! [facility] introduces Ze[employer]ify patient portal. Now you can access parts of your medical record, email your doctor's office, and request medication refills online. 1. In your internet browser, go to [email or web address] 2. Click on the First Time User? Click Here link in the Sign In box. You will see the New Member Sign Up page. 3. Enter your Zealify Access Code exactly as it appears below. You will not need to use this code after youve completed the sign-up process. If you do not sign up before the expiration date, you must request a new code. · Zeal[employer] Access Code: [email or web address] Expires: 11/14/2017 11:39 AM 
 
4. Enter the last four digits of your Social Security Number (xxxx) and Date of Birth (mm/dd/yyyy) as indicated and click Submit. You will be taken to the next sign-up page. 5. Create a Zealify ID. This will be your Zealify login ID and cannot be changed, so think of one that is secure and easy to remember. 6. Create a Zealify password. You can change your password at any time. 7. Enter your Password Reset Question and Answer. This can be used at a later time if you forget your password. 8. Enter your e-mail address. You will receive e-mail notification when new information is available in [address] 9. Click Sign Up. You can now view and download portions of your medical record. 10. Click the Download Summary menu link to download a portable copy of your medical information. If you have questions, please visit the Frequently Asked Questions section of the MyChart website. Remember, MyChart is NOT to be used for urgent needs. For medical emergencies, dial 911. Now available from your iPhone and Android! General Information Please provide this summary of care documentation to your next provider. Patient Signature:  ____________________________________________________________ Date:  ____________________________________________________________  
  
[facility] Provider Signature:  ____________________________________________________________ Date:  ____________________________________________________________
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
## 2017-09-28 NOTE — Clinical Note
Status[de-identified] Inpatient [101] Type of Bed: Intensive Care [6] Inpatient Hospitalization Certified Necessary for the Following Reasons: 9. Other (further clarification in H&P documentation) Admitting Diagnosis: GI bleed [129168] Admitting Physician: Delonte Memos [[id number]] Attending Physician: Arnold Taveras Estimated Length of Stay: 3-4 Midnights Discharge Plan[de-identified] Home with Office Follow-up

## 2017-09-28 NOTE — H&P
Pt has severe thrombocytosis, spoke with Dr. Patrick Stanton  ?  Iron deficiency and reactive  No acute intervention tonite, no anti platelets 8/7/ to GI bleeding  May consider iron infusion tomorrow

## 2017-09-29 ENCOUNTER — APPOINTMENT (OUTPATIENT)
Dept: CT IMAGING | Age: 42
DRG: 386 | End: 2017-09-29
Attending: SPECIALIST
Payer: MEDICAID

## 2017-09-29 ENCOUNTER — ANESTHESIA EVENT (OUTPATIENT)
Dept: ENDOSCOPY | Age: 42
DRG: 386 | End: 2017-09-29
Payer: MEDICAID

## 2017-09-29 ENCOUNTER — ANESTHESIA (OUTPATIENT)
Dept: ENDOSCOPY | Age: 42
DRG: 386 | End: 2017-09-29
Payer: MEDICAID

## 2017-09-29 ENCOUNTER — APPOINTMENT (OUTPATIENT)
Dept: GENERAL RADIOLOGY | Age: 42
DRG: 386 | End: 2017-09-29
Attending: SPECIALIST
Payer: MEDICAID

## 2017-09-29 PROBLEM — K50.90 CROHN DISEASE (HCC): Status: ACTIVE | Noted: 2017-09-29

## 2017-09-29 LAB
ANION GAP SERPL CALC-SCNC: 9 MMOL/L (ref 5–15)
ATRIAL RATE: 77 BPM
BASOPHILS # BLD: 0 K/UL (ref 0–0.1)
BASOPHILS # BLD: 0 K/UL (ref 0–0.1)
BASOPHILS NFR BLD: 0 % (ref 0–1)
BASOPHILS NFR BLD: 0 % (ref 0–1)
BUN SERPL-MCNC: 10 MG/DL (ref 6–20)
BUN/CREAT SERPL: 18 (ref 12–20)
CALCIUM SERPL-MCNC: 7 MG/DL (ref 8.5–10.1)
CALCULATED P AXIS, ECG09: 65 DEGREES
CALCULATED R AXIS, ECG10: 57 DEGREES
CALCULATED T AXIS, ECG11: 51 DEGREES
CHLORIDE SERPL-SCNC: 115 MMOL/L (ref 97–108)
CO2 SERPL-SCNC: 19 MMOL/L (ref 21–32)
CREAT SERPL-MCNC: 0.56 MG/DL (ref 0.7–1.3)
DIAGNOSIS, 93000: NORMAL
DIFFERENTIAL METHOD BLD: ABNORMAL
DIFFERENTIAL METHOD BLD: ABNORMAL
EOSINOPHIL # BLD: 0.2 K/UL (ref 0–0.4)
EOSINOPHIL # BLD: 0.3 K/UL (ref 0–0.4)
EOSINOPHIL NFR BLD: 2 % (ref 0–7)
EOSINOPHIL NFR BLD: 3 % (ref 0–7)
ERYTHROCYTE [DISTWIDTH] IN BLOOD BY AUTOMATED COUNT: 19.6 % (ref 11.5–14.5)
ERYTHROCYTE [DISTWIDTH] IN BLOOD BY AUTOMATED COUNT: 31.4 % (ref 11.5–14.5)
GLUCOSE BLD STRIP.AUTO-MCNC: 173 MG/DL (ref 65–100)
GLUCOSE SERPL-MCNC: 75 MG/DL (ref 65–100)
HCT VFR BLD AUTO: 13.3 % (ref 36.6–50.3)
HCT VFR BLD AUTO: 21.7 % (ref 36.6–50.3)
HGB BLD-MCNC: 3.6 G/DL (ref 12.1–17)
HGB BLD-MCNC: 6.4 G/DL (ref 12.1–17)
IRON SATN MFR SERPL: 9 % (ref 20–50)
IRON SERPL-MCNC: 29 UG/DL (ref 35–150)
LYMPHOCYTES # BLD: 1.5 K/UL (ref 0.8–3.5)
LYMPHOCYTES # BLD: 2 K/UL (ref 0.8–3.5)
LYMPHOCYTES NFR BLD: 20 % (ref 12–49)
LYMPHOCYTES NFR BLD: 24 % (ref 12–49)
MCH RBC QN AUTO: 14.5 PG (ref 26–34)
MCH RBC QN AUTO: 19.5 PG (ref 26–34)
MCHC RBC AUTO-ENTMCNC: 27.1 G/DL (ref 30–36.5)
MCHC RBC AUTO-ENTMCNC: 29.5 G/DL (ref 30–36.5)
MCV RBC AUTO: 53.4 FL (ref 80–99)
MCV RBC AUTO: 66 FL (ref 80–99)
MONOCYTES # BLD: 0.7 K/UL (ref 0–1)
MONOCYTES # BLD: 0.7 K/UL (ref 0–1)
MONOCYTES NFR BLD: 8 % (ref 5–13)
MONOCYTES NFR BLD: 9 % (ref 5–13)
NEUTS SEG # BLD: 5.2 K/UL (ref 1.8–8)
NEUTS SEG # BLD: 5.4 K/UL (ref 1.8–8)
NEUTS SEG NFR BLD: 65 % (ref 32–75)
NEUTS SEG NFR BLD: 69 % (ref 32–75)
P-R INTERVAL, ECG05: 144 MS
PATH REV BLD -IMP: ABNORMAL
PLATELET # BLD AUTO: 1175 K/UL (ref 150–400)
PLATELET # BLD AUTO: 958 K/UL (ref 150–400)
PLATELET COMMENTS,PCOM: ABNORMAL
POTASSIUM SERPL-SCNC: 3.3 MMOL/L (ref 3.5–5.1)
Q-T INTERVAL, ECG07: 362 MS
QRS DURATION, ECG06: 92 MS
QTC CALCULATION (BEZET), ECG08: 409 MS
RBC # BLD AUTO: 2.49 M/UL (ref 4.1–5.7)
RBC # BLD AUTO: 3.29 M/UL (ref 4.1–5.7)
RBC MORPH BLD: ABNORMAL
SERVICE CMNT-IMP: ABNORMAL
SODIUM SERPL-SCNC: 143 MMOL/L (ref 136–145)
TIBC SERPL-MCNC: 324 UG/DL (ref 250–450)
VENTRICULAR RATE, ECG03: 77 BPM
WBC # BLD AUTO: 7.6 K/UL (ref 4.1–11.1)
WBC # BLD AUTO: 8.4 K/UL (ref 4.1–11.1)

## 2017-09-29 PROCEDURE — P9016 RBC LEUKOCYTES REDUCED: HCPCS | Performed by: NURSE PRACTITIONER

## 2017-09-29 PROCEDURE — C9113 INJ PANTOPRAZOLE SODIUM, VIA: HCPCS | Performed by: FAMILY MEDICINE

## 2017-09-29 PROCEDURE — 71020 XR CHEST PA LAT: CPT

## 2017-09-29 PROCEDURE — 82962 GLUCOSE BLOOD TEST: CPT

## 2017-09-29 PROCEDURE — 83540 ASSAY OF IRON: CPT | Performed by: NURSE PRACTITIONER

## 2017-09-29 PROCEDURE — 74011000302 HC RX REV CODE- 302: Performed by: SPECIALIST

## 2017-09-29 PROCEDURE — 76040000019: Performed by: SPECIALIST

## 2017-09-29 PROCEDURE — 0DJ08ZZ INSPECTION OF UPPER INTESTINAL TRACT, VIA NATURAL OR ARTIFICIAL OPENING ENDOSCOPIC: ICD-10-PCS | Performed by: SPECIALIST

## 2017-09-29 PROCEDURE — 74011000250 HC RX REV CODE- 250: Performed by: FAMILY MEDICINE

## 2017-09-29 PROCEDURE — 74011636320 HC RX REV CODE- 636/320: Performed by: HOSPITALIST

## 2017-09-29 PROCEDURE — 86580 TB INTRADERMAL TEST: CPT | Performed by: SPECIALIST

## 2017-09-29 PROCEDURE — 36415 COLL VENOUS BLD VENIPUNCTURE: CPT | Performed by: FAMILY MEDICINE

## 2017-09-29 PROCEDURE — 65270000029 HC RM PRIVATE

## 2017-09-29 PROCEDURE — 36430 TRANSFUSION BLD/BLD COMPNT: CPT

## 2017-09-29 PROCEDURE — 80048 BASIC METABOLIC PNL TOTAL CA: CPT | Performed by: FAMILY MEDICINE

## 2017-09-29 PROCEDURE — 76060000031 HC ANESTHESIA FIRST 0.5 HR: Performed by: SPECIALIST

## 2017-09-29 PROCEDURE — 74011250636 HC RX REV CODE- 250/636: Performed by: FAMILY MEDICINE

## 2017-09-29 PROCEDURE — 74011000250 HC RX REV CODE- 250

## 2017-09-29 PROCEDURE — 85025 COMPLETE CBC W/AUTO DIFF WBC: CPT | Performed by: FAMILY MEDICINE

## 2017-09-29 PROCEDURE — 74011250636 HC RX REV CODE- 250/636

## 2017-09-29 PROCEDURE — 74176 CT ABD & PELVIS W/O CONTRAST: CPT

## 2017-09-29 RX ORDER — SODIUM CHLORIDE 9 MG/ML
250 INJECTION, SOLUTION INTRAVENOUS AS NEEDED
Status: DISCONTINUED | OUTPATIENT
Start: 2017-09-29 | End: 2017-10-03 | Stop reason: HOSPADM

## 2017-09-29 RX ORDER — PROPOFOL 10 MG/ML
INJECTION, EMULSION INTRAVENOUS AS NEEDED
Status: DISCONTINUED | OUTPATIENT
Start: 2017-09-29 | End: 2017-09-29 | Stop reason: HOSPADM

## 2017-09-29 RX ORDER — SODIUM CHLORIDE 9 MG/ML
50 INJECTION, SOLUTION INTRAVENOUS CONTINUOUS
Status: DISPENSED | OUTPATIENT
Start: 2017-09-29 | End: 2017-09-29

## 2017-09-29 RX ORDER — FLUMAZENIL 0.1 MG/ML
0.2 INJECTION INTRAVENOUS
Status: DISCONTINUED | OUTPATIENT
Start: 2017-09-29 | End: 2017-09-29 | Stop reason: HOSPADM

## 2017-09-29 RX ORDER — SODIUM CHLORIDE 9 MG/ML
INJECTION, SOLUTION INTRAVENOUS
Status: DISCONTINUED | OUTPATIENT
Start: 2017-09-29 | End: 2017-09-29 | Stop reason: HOSPADM

## 2017-09-29 RX ORDER — MIDAZOLAM HYDROCHLORIDE 1 MG/ML
.25-1 INJECTION, SOLUTION INTRAMUSCULAR; INTRAVENOUS
Status: DISCONTINUED | OUTPATIENT
Start: 2017-09-29 | End: 2017-09-29 | Stop reason: HOSPADM

## 2017-09-29 RX ORDER — ATROPINE SULFATE 0.1 MG/ML
0.5 INJECTION INTRAVENOUS
Status: DISCONTINUED | OUTPATIENT
Start: 2017-09-29 | End: 2017-09-29 | Stop reason: HOSPADM

## 2017-09-29 RX ORDER — DEXTROMETHORPHAN/PSEUDOEPHED 2.5-7.5/.8
1.2 DROPS ORAL
Status: DISCONTINUED | OUTPATIENT
Start: 2017-09-29 | End: 2017-09-29 | Stop reason: HOSPADM

## 2017-09-29 RX ORDER — EPINEPHRINE 0.1 MG/ML
1 INJECTION INTRACARDIAC; INTRAVENOUS
Status: DISCONTINUED | OUTPATIENT
Start: 2017-09-29 | End: 2017-09-29 | Stop reason: HOSPADM

## 2017-09-29 RX ORDER — SODIUM CHLORIDE 0.9 % (FLUSH) 0.9 %
5-10 SYRINGE (ML) INJECTION AS NEEDED
Status: ACTIVE | OUTPATIENT
Start: 2017-09-29 | End: 2017-09-29

## 2017-09-29 RX ORDER — SODIUM CHLORIDE 0.9 % (FLUSH) 0.9 %
5-10 SYRINGE (ML) INJECTION EVERY 8 HOURS
Status: COMPLETED | OUTPATIENT
Start: 2017-09-29 | End: 2017-09-29

## 2017-09-29 RX ORDER — LIDOCAINE HYDROCHLORIDE 20 MG/ML
INJECTION, SOLUTION EPIDURAL; INFILTRATION; INTRACAUDAL; PERINEURAL AS NEEDED
Status: DISCONTINUED | OUTPATIENT
Start: 2017-09-29 | End: 2017-09-29 | Stop reason: HOSPADM

## 2017-09-29 RX ORDER — NALOXONE HYDROCHLORIDE 0.4 MG/ML
0.4 INJECTION, SOLUTION INTRAMUSCULAR; INTRAVENOUS; SUBCUTANEOUS
Status: DISCONTINUED | OUTPATIENT
Start: 2017-09-29 | End: 2017-09-29 | Stop reason: HOSPADM

## 2017-09-29 RX ORDER — FENTANYL CITRATE 50 UG/ML
200 INJECTION, SOLUTION INTRAMUSCULAR; INTRAVENOUS
Status: DISCONTINUED | OUTPATIENT
Start: 2017-09-29 | End: 2017-09-29 | Stop reason: HOSPADM

## 2017-09-29 RX ADMIN — PROPOFOL 70 MG: 10 INJECTION, EMULSION INTRAVENOUS at 17:19

## 2017-09-29 RX ADMIN — Medication 10 ML: at 17:34

## 2017-09-29 RX ADMIN — SODIUM CHLORIDE: 9 INJECTION, SOLUTION INTRAVENOUS at 16:45

## 2017-09-29 RX ADMIN — SODIUM CHLORIDE 40 MG: 9 INJECTION INTRAMUSCULAR; INTRAVENOUS; SUBCUTANEOUS at 22:35

## 2017-09-29 RX ADMIN — Medication 10 ML: at 14:00

## 2017-09-29 RX ADMIN — PROPOFOL 70 MG: 10 INJECTION, EMULSION INTRAVENOUS at 17:23

## 2017-09-29 RX ADMIN — LEVOFLOXACIN 750 MG: 5 INJECTION, SOLUTION INTRAVENOUS at 01:26

## 2017-09-29 RX ADMIN — PROPOFOL 150 MG: 10 INJECTION, EMULSION INTRAVENOUS at 17:12

## 2017-09-29 RX ADMIN — METHYLPREDNISOLONE SODIUM SUCCINATE 60 MG: 125 INJECTION, POWDER, FOR SOLUTION INTRAMUSCULAR; INTRAVENOUS at 22:37

## 2017-09-29 RX ADMIN — PROPOFOL 130 MG: 10 INJECTION, EMULSION INTRAVENOUS at 17:15

## 2017-09-29 RX ADMIN — SODIUM CHLORIDE 100 ML/HR: 900 INJECTION, SOLUTION INTRAVENOUS at 00:33

## 2017-09-29 RX ADMIN — PROPOFOL 50 MG: 10 INJECTION, EMULSION INTRAVENOUS at 17:16

## 2017-09-29 RX ADMIN — LIDOCAINE HYDROCHLORIDE 50 MG: 20 INJECTION, SOLUTION EPIDURAL; INFILTRATION; INTRACAUDAL; PERINEURAL at 17:12

## 2017-09-29 RX ADMIN — HYDROMORPHONE HYDROCHLORIDE 1 MG: 1 INJECTION, SOLUTION INTRAMUSCULAR; INTRAVENOUS; SUBCUTANEOUS at 23:12

## 2017-09-29 RX ADMIN — METRONIDAZOLE 500 MG: 500 INJECTION, SOLUTION INTRAVENOUS at 14:00

## 2017-09-29 RX ADMIN — Medication 10 ML: at 05:33

## 2017-09-29 RX ADMIN — METRONIDAZOLE 500 MG: 500 INJECTION, SOLUTION INTRAVENOUS at 22:37

## 2017-09-29 RX ADMIN — SODIUM CHLORIDE 100 ML/HR: 900 INJECTION, SOLUTION INTRAVENOUS at 13:49

## 2017-09-29 RX ADMIN — TUBERCULIN PURIFIED PROTEIN DERIVATIVE 5 UNITS: 5 INJECTION INTRADERMAL at 22:53

## 2017-09-29 RX ADMIN — HYDROMORPHONE HYDROCHLORIDE 1 MG: 1 INJECTION, SOLUTION INTRAMUSCULAR; INTRAVENOUS; SUBCUTANEOUS at 10:35

## 2017-09-29 RX ADMIN — LEVOFLOXACIN 750 MG: 5 INJECTION, SOLUTION INTRAVENOUS at 22:38

## 2017-09-29 RX ADMIN — METHYLPREDNISOLONE SODIUM SUCCINATE 60 MG: 125 INJECTION, POWDER, FOR SOLUTION INTRAMUSCULAR; INTRAVENOUS at 13:59

## 2017-09-29 RX ADMIN — METRONIDAZOLE 500 MG: 500 INJECTION, SOLUTION INTRAVENOUS at 05:32

## 2017-09-29 RX ADMIN — Medication 10 ML: at 22:35

## 2017-09-29 RX ADMIN — IOHEXOL 50 ML: 240 INJECTION, SOLUTION INTRATHECAL; INTRAVASCULAR; INTRAVENOUS; ORAL at 19:00

## 2017-09-29 NOTE — PROGRESS NOTES
Primary Nurse Abraham Melchor RN and Nichole Restrepo RN performed a dual skin assessment on this patient. Noted the following:     Scratches and scars on bilat lowe extremities.      Jose J score is 21

## 2017-09-29 NOTE — PROGRESS NOTES
0730. Bedside and Verbal shift change report given to Piedad Dill RN by Piedad Dill RN. Report included the following information SBAR, Kardex, ED Summary, Procedure Summary, Intake/Output, MAR, Accordion, Recent Results, Med Rec Status and Cardiac Rhythm NSR.

## 2017-09-29 NOTE — PERIOP NOTES
TRANSFER - OUT REPORT:    Verbal report given to Shalonda(name) on Heaven Ely  being transferred to [facility](unit) for routine post - op       Report consisted of patients Situation, Background, Assessment and   Recommendations(SBAR). Information from the following report(s) Procedure Summary was reviewed with the receiving nurse. Lines:   Peripheral IV 09/28/17 Right Antecubital (Active)   Site Assessment Clean, dry, & intact 9/29/2017  2:33 PM   Phlebitis Assessment 0 9/29/2017  2:33 PM   Infiltration Assessment 0 9/29/2017  2:33 PM   Dressing Status Clean, dry, & intact 9/29/2017  2:33 PM   Dressing Type Transparent;Tape 9/29/2017  2:33 PM   Hub Color/Line Status Capped;Flushed 9/29/2017  2:33 PM   Action Taken Open ports on tubing capped 9/29/2017  2:33 PM   Alcohol Cap Used Yes 9/29/2017  2:33 PM       Peripheral IV 09/28/17 Left Antecubital (Active)   Site Assessment Clean, dry, & intact 9/29/2017  2:33 PM   Phlebitis Assessment 0 9/29/2017  2:33 PM   Infiltration Assessment 0 9/29/2017  2:33 PM   Dressing Status Clean, dry, & intact 9/29/2017  2:33 PM   Dressing Type Transparent;Tape 9/29/2017  2:33 PM   Hub Color/Line Status Purple;Capped; Infusing; End cap changed 9/29/2017  2:33 PM   Action Taken Open ports on tubing capped 9/29/2017  2:33 PM   Alcohol Cap Used Yes 9/29/2017  2:33 PM        Opportunity for questions and clarification was provided.

## 2017-09-29 NOTE — PERIOP NOTES
Wendy Ngo Endoscope was pre-cleaned at bedside immediately following procedure by Daniel Chester RN. Wendy Ngo

## 2017-09-29 NOTE — ANESTHESIA PREPROCEDURE EVALUATION
Anesthetic History   No history of anesthetic complications            Review of Systems / Medical History  Patient summary reviewed, nursing notes reviewed and pertinent labs reviewed    Pulmonary          Smoker         Neuro/Psych   Within defined limits           Cardiovascular  Within defined limits                Exercise tolerance: <4 METS     GI/Hepatic/Renal               Comments: crohns Endo/Other        Anemia     Other Findings              Physical Exam    Airway  Mallampati: II  TM Distance: > 6 cm  Neck ROM: normal range of motion   Mouth opening: Normal     Cardiovascular  Regular rate and rhythm,  S1 and S2 normal,  no murmur, click, rub, or gallop             Dental    Dentition: Upper partial plate     Pulmonary  Breath sounds clear to auscultation               Abdominal  GI exam deferred       Other Findings            Anesthetic Plan    ASA: 2  Anesthesia type: MAC          Induction: Intravenous  Anesthetic plan and risks discussed with: Patient

## 2017-09-29 NOTE — ROUTINE PROCESS
TRANSFER - OUT REPORT:    Verbal report given to Aditya Jang RN (name) on Aicha Ahr  being transferred to [facility] (unit) for routine progression of care       Report consisted of patients Situation, Background, Assessment and   Recommendations(SBAR). Information from the following report(s) SBAR, ED Summary and MAR was reviewed with the receiving nurse. Lines:   Peripheral IV 09/28/17 Right Arm (Active)   Site Assessment Clean, dry, & intact 9/28/2017  3:20 PM   Phlebitis Assessment 0 9/28/2017  3:20 PM   Infiltration Assessment 0 9/28/2017  3:20 PM   Dressing Status Clean, dry, & intact 9/28/2017  3:20 PM   Hub Color/Line Status Pink 9/28/2017  3:20 PM        Opportunity for questions and clarification was provided.       Patient transported with:   Registered Nurse

## 2017-09-29 NOTE — PROGRESS NOTES
Patient arrived in room. Nauseated and vomitted small amount per patient. Declines Zofran, would rather vomit than receive anti-emetic med. Patient only wants to received medicines pertaining to his condition (Chron's).
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
## 2017-09-29 NOTE — PROGRESS NOTES
Patient upset of not having his dinner after Endoscopy. Explained next ordered procedure (CT). Patient agreed. Now taking first bottle of contrast. Would like to eat a lot after CT scan.

## 2017-09-29 NOTE — PROGRESS NOTES
Primary Nurse Kingston Avila RN and Andrez Dancer, RN performed a dual skin assessment on this patient No impairment noted  Jose J score is 21. Patient has closed scratches to BLE. No open areas.

## 2017-09-29 NOTE — PROGRESS NOTES
Hospitalist Progress Note  Kobe Quick MD  Answering service: [phone] from in house phone  Cell: [phone]      Date of Service:  2017  NAME:  Blair Ibarra  :  1975  MRN:  [id number]      Admission Summary:   The patient is a 45-year-old gentleman with past medical history of inflammatory bowel disease, history of alcohol abuse, history of tobacco abuse, who presents to the hospital with abdominal pain and weakness.       Interval history / Subjective:   Patient stated that he is hungry and requested to eat, no abdominal pain     Assessment & Plan:     Acute blood loss anemia likely related to inflammatory bowel disease   -continue protonix iv, and IVF  -Hgb dropped to 3.4, s/p 3 units PRBC, and Hgb 6.4, no active bleeding, transfuse to one more unit on , and repeat H/H  -GI on board     Abdominal pain likely due to acute crohn's flare up  -CT abdomen and pelvis thickening and dilatation of the right colon and transverse colon to  the level of the splenic flexure where there is more extensive thickening and a presumed stricture. Immediately inferior to the colon at this level, there is abnormal soft tissue which extends to the jejunal loop, worrisome for a colon enteric fistula.  Nondistended but thickened income and distal terminal ileum.  -on iv solumedrol, mesalamine, protonix, levaquin,  metronidazole and IVF     Hypokalemia   -replace and repeat k levle     Chronic thrombocytosis   -platelet down today to 958 from 1115     Tobacco abuse   -advised to stop alcohol    Non complaint  -patient left AMA recently from [address]    Code status: Full Code  DVT prophylaxis: SCD    Care Plan discussed with: Patient/Family, Nurse and   Disposition: Home w/Family     Hospital Problems  Date Reviewed: 2017          Codes Class Noted POA    * (Principal)Acute blood loss anemia ICD-10-CM: D62  ICD-9-CM: 285.1  9/28/2017 Unknown        GI bleed ICD-10-CM: K92.2  ICD-9-CM: 578.9  9/28/2017 Unknown              Vital Signs:    Last 24hrs VS reviewed since prior progress note. Most recent are:  Visit Vitals    /71 (BP 1 Location: Left arm, BP Patient Position: At rest)    Pulse 61    Temp 98.2 °F (36.8 °C)    Resp 18    Ht 6' (1.829 m)    Wt 58.3 kg (128 lb 8.5 oz)    SpO2 100%    BMI 17.43 kg/m2         Intake/Output Summary (Last 24 hours) at 09/29/17 1531  Last data filed at 09/29/17 1300   Gross per 24 hour   Intake          2240.95 ml   Output              400 ml   Net          1840. 95 ml        Physical Examination:             Constitutional:  No acute distress, cooperative, pleasant    ENT:  Oral mucous moist, oropharynx benign. Neck supple,    Resp:  CTA bilaterally. No wheezing/rhonchi/rales. No accessory muscle use   CV:  Regular rhythm, normal rate, no murmurs, gallops, rubs    GI:  Soft, non distended, non tender. normoactive bowel sounds, no hepatosplenomegaly     Musculoskeletal:  No edema    Neurologic:  Moves all extremities. AAOx3, CN II-XII reviewed     Skin:  Good turgor, no rashes or ulcers       Data Review:    Review and/or order of clinical lab test      Labs:     Recent Labs      09/29/17   0541  09/28/17   2235  09/28/17   1608   WBC  7.6   --   7.5   HGB  6.4*  4.1*  3.4*   HCT  21.7*   --   12.7*   PLT  958*   --   1115*     Recent Labs      09/29/17   0541  09/28/17   1517   NA  143  140   K  3.3*  3.5   CL  115*  107   CO2  19*  23   BUN  10  12   CREA  0.56*  0.76   GLU  75  80   CA  7.0*  8.5     Recent Labs      09/28/17   1517   SGOT  9*   ALT  10*   AP  68   TBILI  <0.1*   TP  7.2   ALB  2.7*   GLOB  4.5*   LPSE  132     Recent Labs      09/28/17   1836   INR  1.0   PTP  10.4      Recent Labs      09/29/17   0541   TIBC  324   PSAT  9*      No results found for: FOL, RBCF   No results for input(s): PH, PCO2, PO2 in the last 72 hours.   Recent Labs 09/28/17   1517   TROIQ  <0.04     No results found for: CHOL, CHOLX, CHLST, CHOLV, HDL, LDL, LDLC, DLDLP, TGLX, TRIGL, TRIGP, CHHD, CHHDX  No results found for: [facility]  Lab Results   Component Value Date/Time    Color YELLOW/STRAW 07/18/2017 12:10 AM    Appearance CLEAR 07/18/2017 12:10 AM    Specific gravity 1.030 07/18/2017 12:10 AM    pH (UA) 5.5 07/18/2017 12:10 AM    Protein TRACE 07/18/2017 12:10 AM    Glucose NEGATIVE  07/18/2017 12:10 AM    Ketone TRACE 07/18/2017 12:10 AM    Urobilinogen 1.0 07/18/2017 12:10 AM    Nitrites NEGATIVE  07/18/2017 12:10 AM    Leukocyte Esterase NEGATIVE  07/18/2017 12:10 AM    Epithelial cells FEW 07/18/2017 12:10 AM    Bacteria NEGATIVE  07/18/2017 12:10 AM    WBC 0-4 07/18/2017 12:10 AM    RBC 0-5 07/18/2017 12:10 AM         Medications Reviewed:     Current Facility-Administered Medications   Medication Dose Route Frequency    sodium chloride (NS) flush 5-10 mL  5-10 mL IntraVENous Q8H    sodium chloride (NS) flush 5-10 mL  5-10 mL IntraVENous PRN    ondansetron (ZOFRAN) injection 4 mg  4 mg IntraVENous Q4H PRN    pantoprazole (PROTONIX) 40 mg in sodium chloride 0.9 % 10 mL injection  40 mg IntraVENous Q24H    methylPREDNISolone (PF) (SOLU-MEDROL) injection 60 mg  60 mg IntraVENous Q8H    levoFLOXacin (LEVAQUIN) 750 mg in D5W IVPB  750 mg IntraVENous Q24H    metroNIDAZOLE (FLAGYL) IVPB premix 500 mg  500 mg IntraVENous Q8H    0.9% sodium chloride infusion  100 mL/hr IntraVENous CONTINUOUS    mesalamine DR (ASACOL HD) tablet 800 mg  800 mg Oral TID WITH MEALS    HYDROmorphone (PF) (DILAUDID) injection 1 mg  1 mg IntraVENous Q4H PRN    0.9% sodium chloride infusion 250 mL  250 mL IntraVENous PRN    diazePAM (VALIUM) injection 5 mg  5 mg IntraVENous Q4H PRN     ______________________________________________________________________  EXPECTED LENGTH OF STAY: - - -  ACTUAL LENGTH OF STAY:          1                 Cipriano Joy MD

## 2017-09-29 NOTE — PROGRESS NOTES
DW radiologist, Dr Taylor Cox. We will order CT with PO contrast to identify jejunocolic fistula. We will also order Chest X ray and PPD in anticipation of starting biologic treatment of Crohn disease.

## 2017-09-29 NOTE — PROCEDURES
[address] [facility] [address], [address]                 NAME:  Robert Walker   :   1975   MRN:   [id number]     Date/Time:  2017 5:52 PM    Enteroscopy Procedure Note    :  Sara Gill MD    Referring Provider:  None    Anethesia/Sedation:  MAC anesthesia Propofol    Preoperative diagnosis: Iron def anemia, melena and enterocolic fistula    Postoperative diagnosis: As above    Procedure Details     After infom consent was obtained for the procedure, with all risks and benefits of procedure explained the patient was taken to the endoscopy suite and placed in the left lateral decubitus position. Following sequential administration of sedation as per above, the Enteroscope gastroscope was inserted into the mouth and advanced under direct vision to proximal jejunum. A careful inspection was made as the gastroscope was withdrawn, including a retroflexed view of the proximal stomach; findings and interventions are described below. Findings:  Esophagus:normal  Stomach:normal   Duodenum/jejunum:normal mucosa upto proximal jejunum. Therapies:  none    Specimens: none           EBL: None    Complications:   None; patient tolerated the procedure well.            Impression:    See Postoperative diagnosis above    Recommendations:  -GI lite diet, Continue steroids, will DW radiologist regarding testing for Enterocolic fistula        Sara Gill MD

## 2017-09-29 NOTE — PROGRESS NOTES
Problem: Falls - Risk of  Goal: *Absence of Falls  Document Nancy Fall Risk and appropriate interventions in the flowsheet.    Outcome: Progressing Towards Goal  Fall Risk Interventions:              Medication Interventions: Evaluate medications/consider consulting pharmacy, Patient to call before getting OOB, Teach patient to arise slowly           History of Falls Interventions: Consult care management for discharge planning, Door open when patient unattended, Evaluate medications/consider consulting pharmacy, Investigate reason for fall, Room close to nurse's station

## 2017-09-29 NOTE — PROGRESS NOTES
TRANSFER - IN REPORT:    Verbal report received from Brigid(name) derrell Elliselor  being received from ICU (unit) for routine progression of care      Report consisted of patients Situation, Background, Assessment and   Recommendations(SBAR). Information from the following report(s) SBAR and Cardiac Rhythm Sinus / Sinus Ayla Dorman (when asleep) was reviewed with the receiving nurse. Opportunity for questions and clarification was provided. Assessment completed upon patients arrival to unit and care assumed.
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
## 2017-09-29 NOTE — PROGRESS NOTES
2115: TRANSFER - IN REPORT:    Verbal report received from [id number] DAISY Chance RN(name) on Marbella Devin  being received from [facility] ED(unit) for routine progression of care      Report consisted of patients Situation, Background, Assessment and   Recommendations(SBAR). Information from the following report(s) SBAR, Kardex, ED Summary, Procedure Summary, Intake/Output, MAR, Accordion, Recent Results, Med Rec Status, Cardiac Rhythm NSR and Alarm Parameters  was reviewed with the receiving nurse. Opportunity for questions and clarification was provided. Assessment completed upon patients arrival to unit and care assumed. 2200: Primary Nurse Jama Payan and Nisha Darby RN performed a dual skin assessment on this patient No impairment noted  Jose J score is 21    0730: Bedside and Verbal shift change report given to JAY Loomis (oncoming nurse) by Meggan Del Angel. Beata Fairbanks RN (offgoing nurse). Report included the following information SBAR, Kardex, ED Summary, Procedure Summary, Intake/Output, MAR, Accordion, Recent Results, Med Rec Status, Cardiac Rhythm NSR and Alarm Parameters . SHIFT SUMMARY: Total of 3units PRBC's given to patient over shift, Hgb now 6.4 up from 4.1. Patient is A/Ox4, moving all extremities. Afebrile. Intermittent c/o abdominal pain, PRN dilaudid given x1. SBP's running in 90's-110's. NSR to [facility] [employer] during course of shift. Estimated amount of sleep 3 hours. CAM negative.

## 2017-09-29 NOTE — PERIOP NOTES
Patient has been evaluated by anesthesia pre-procedure. Patient alert and oriented. Vital signs will not be charted by the Endoscopy nurse. All vitals, airway, and loc are monitored by anesthesia staff throughout procedure. .TRANSFER - IN REPORT:    Verbal report received from Luisame) on Laurel Perez  being received from [facility](unit) for ordered procedure      Report consisted of patients Situation, Background, Assessment and   Recommendations(SBAR). Information from the following report(s) SBAR and Procedure Summary was reviewed with the receiving nurse. Opportunity for questions and clarification was provided. Assessment completed upon patients arrival to unit and care assumed.

## 2017-09-30 LAB
ALBUMIN SERPL-MCNC: 2.6 G/DL (ref 3.5–5)
ALBUMIN/GLOB SERPL: 0.5 {RATIO} (ref 1.1–2.2)
ALP SERPL-CCNC: 71 U/L (ref 45–117)
ALT SERPL-CCNC: 10 U/L (ref 12–78)
ANION GAP SERPL CALC-SCNC: 9 MMOL/L (ref 5–15)
AST SERPL-CCNC: 11 U/L (ref 15–37)
BILIRUB SERPL-MCNC: 0.3 MG/DL (ref 0.2–1)
BUN SERPL-MCNC: 12 MG/DL (ref 6–20)
BUN/CREAT SERPL: 16 (ref 12–20)
CALCIUM SERPL-MCNC: 8.9 MG/DL (ref 8.5–10.1)
CHLORIDE SERPL-SCNC: 107 MMOL/L (ref 97–108)
CO2 SERPL-SCNC: 19 MMOL/L (ref 21–32)
CREAT SERPL-MCNC: 0.75 MG/DL (ref 0.7–1.3)
GLOBULIN SER CALC-MCNC: 4.8 G/DL (ref 2–4)
GLUCOSE BLD STRIP.AUTO-MCNC: 138 MG/DL (ref 65–100)
GLUCOSE BLD STRIP.AUTO-MCNC: 183 MG/DL (ref 65–100)
GLUCOSE SERPL-MCNC: 113 MG/DL (ref 65–100)
HCT VFR BLD AUTO: 25.6 % (ref 36.6–50.3)
HGB BLD-MCNC: 7.9 G/DL (ref 12.1–17)
MCH RBC QN AUTO: 20.5 PG (ref 26–34)
MCHC RBC AUTO-ENTMCNC: 30.9 G/DL (ref 30–36.5)
MCV RBC AUTO: 66.5 FL (ref 80–99)
PLATELET # BLD AUTO: 997 K/UL (ref 150–400)
POTASSIUM SERPL-SCNC: 4.1 MMOL/L (ref 3.5–5.1)
PROT SERPL-MCNC: 7.4 G/DL (ref 6.4–8.2)
RBC # BLD AUTO: 3.85 M/UL (ref 4.1–5.7)
SERVICE CMNT-IMP: ABNORMAL
SERVICE CMNT-IMP: ABNORMAL
SODIUM SERPL-SCNC: 135 MMOL/L (ref 136–145)
WBC # BLD AUTO: 7.7 K/UL (ref 4.1–11.1)

## 2017-09-30 PROCEDURE — C9113 INJ PANTOPRAZOLE SODIUM, VIA: HCPCS | Performed by: FAMILY MEDICINE

## 2017-09-30 PROCEDURE — 80053 COMPREHEN METABOLIC PANEL: CPT | Performed by: HOSPITALIST

## 2017-09-30 PROCEDURE — 74011250637 HC RX REV CODE- 250/637: Performed by: FAMILY MEDICINE

## 2017-09-30 PROCEDURE — 65270000029 HC RM PRIVATE

## 2017-09-30 PROCEDURE — 82962 GLUCOSE BLOOD TEST: CPT

## 2017-09-30 PROCEDURE — P9016 RBC LEUKOCYTES REDUCED: HCPCS | Performed by: NURSE PRACTITIONER

## 2017-09-30 PROCEDURE — 85027 COMPLETE CBC AUTOMATED: CPT | Performed by: HOSPITALIST

## 2017-09-30 PROCEDURE — 74011000250 HC RX REV CODE- 250: Performed by: FAMILY MEDICINE

## 2017-09-30 PROCEDURE — 74011250636 HC RX REV CODE- 250/636: Performed by: FAMILY MEDICINE

## 2017-09-30 PROCEDURE — 36415 COLL VENOUS BLD VENIPUNCTURE: CPT | Performed by: HOSPITALIST

## 2017-09-30 PROCEDURE — 36430 TRANSFUSION BLD/BLD COMPNT: CPT

## 2017-09-30 RX ADMIN — Medication 10 ML: at 22:08

## 2017-09-30 RX ADMIN — MESALAMINE 800 MG: 800 TABLET, DELAYED RELEASE ORAL at 16:51

## 2017-09-30 RX ADMIN — Medication 10 ML: at 21:28

## 2017-09-30 RX ADMIN — METHYLPREDNISOLONE SODIUM SUCCINATE 60 MG: 125 INJECTION, POWDER, FOR SOLUTION INTRAMUSCULAR; INTRAVENOUS at 21:25

## 2017-09-30 RX ADMIN — METHYLPREDNISOLONE SODIUM SUCCINATE 60 MG: 125 INJECTION, POWDER, FOR SOLUTION INTRAMUSCULAR; INTRAVENOUS at 14:29

## 2017-09-30 RX ADMIN — Medication 10 ML: at 06:25

## 2017-09-30 RX ADMIN — SODIUM CHLORIDE 40 MG: 9 INJECTION INTRAMUSCULAR; INTRAVENOUS; SUBCUTANEOUS at 21:29

## 2017-09-30 RX ADMIN — METRONIDAZOLE 500 MG: 500 INJECTION, SOLUTION INTRAVENOUS at 06:24

## 2017-09-30 RX ADMIN — METRONIDAZOLE 500 MG: 500 INJECTION, SOLUTION INTRAVENOUS at 21:21

## 2017-09-30 RX ADMIN — LEVOFLOXACIN 750 MG: 5 INJECTION, SOLUTION INTRAVENOUS at 21:24

## 2017-09-30 RX ADMIN — METRONIDAZOLE 500 MG: 500 INJECTION, SOLUTION INTRAVENOUS at 14:30

## 2017-09-30 RX ADMIN — MESALAMINE 800 MG: 800 TABLET, DELAYED RELEASE ORAL at 08:21

## 2017-09-30 RX ADMIN — MESALAMINE 800 MG: 800 TABLET, DELAYED RELEASE ORAL at 11:41

## 2017-09-30 RX ADMIN — Medication 10 ML: at 14:36

## 2017-09-30 RX ADMIN — SODIUM CHLORIDE 100 ML/HR: 900 INJECTION, SOLUTION INTRAVENOUS at 12:51

## 2017-09-30 RX ADMIN — HYDROMORPHONE HYDROCHLORIDE 1 MG: 1 INJECTION, SOLUTION INTRAMUSCULAR; INTRAVENOUS; SUBCUTANEOUS at 22:08

## 2017-09-30 RX ADMIN — METHYLPREDNISOLONE SODIUM SUCCINATE 60 MG: 125 INJECTION, POWDER, FOR SOLUTION INTRAMUSCULAR; INTRAVENOUS at 06:24

## 2017-09-30 NOTE — PROGRESS NOTES
Bedside shift change report given to Felisa Obrien RN  (oncoming nurse) by Joanna Bull RN  (offgoing nurse). Report included the following information SBAR and MAR.

## 2017-09-30 NOTE — PROGRESS NOTES
Hospitalist Progress Note  Demar Alvarado MD  Answering service: [phone] from in house phone  Cell: [phone]      Date of Service:  2017  NAME:  Kaiden Stephens  :  1975  MRN:  [id number]      Admission Summary:   The patient is a 81-year-old gentleman with past medical history of inflammatory bowel disease, history of alcohol abuse, history of tobacco abuse, who presents to the hospital with abdominal pain and weakness.       Interval history / Subjective:   Had bowel this morning dark brown,      Assessment & Plan:     Acute blood loss anemia likely related to inflammatory bowel disease   -on iv protonix iv, solumedrol, abxs and IVF  -Hgb was to 3.4, s/p 3 units PRBC and Hgb was 6.4, and transfuse to one more unit on , and repeated Hgb 7.9, monitor H/H  -s/p EGD on  normal esophagus, stomach and duodenum and jejunum  -on GI lite diet  -GI on board     Abdominal pain likely due to acute crohn's flare up  -on iv solumedrol, mesalamine, metronidazole, levaquin and IVF  -CT abdomen and pelvis thickening and dilatation of the right colon and transverse colon to  the level of the splenic flexure where there is more extensive thickening and a presumed stricture. Immediately inferior to the colon at this level, there is abnormal soft tissue which extends to the jejunal loop, worrisome for a colon enteric fistula.  Nondistended but thickened income and distal terminal ileum.  -no evidence of fistula  -PPD test for possible biologic treatment, no redness or induration so far  -Chest x ray on  normal    Iron deficiency likely related to inflammatory bowel disease  -transfused total of 4 units of PRBC  -patient with thrombocytosis, repeat iron level later      Hypokalemia   -replaced and resolved     Chronic thrombocytosis   -platelet high but stable, 997 from 1115     Tobacco abuse   -advised to stop alcohol    Non complaint  -patient left AMA recently from [facility]    Code status: Full Code  DVT prophylaxis: SCD    Care Plan discussed with: Patient/Family, Nurse and   Disposition: Home w/Family     Hospital Problems  Date Reviewed: 9/28/2017          Codes Class Noted POA    Crohn disease ([address] ICD-10-CM: K50.90  ICD-9-CM: 555.9  9/29/2017 Yes        * (Principal)Acute blood loss anemia ICD-10-CM: D62  ICD-9-CM: 285.1  9/28/2017 Unknown        GI bleed ICD-10-CM: K92.2  ICD-9-CM: 578.9  9/28/2017 Unknown              Vital Signs:    Last 24hrs VS reviewed since prior progress note. Most recent are:  Visit Vitals    /83 (BP 1 Location: Right arm, BP Patient Position: During activity)    Pulse 74    Temp 98.7 °F (37.1 °C)    Resp 18    Ht 6' (1.829 m)    Wt 58.3 kg (128 lb 8.5 oz)    SpO2 100%    BMI 17.43 kg/m2         Intake/Output Summary (Last 24 hours) at 09/30/17 1043  Last data filed at 09/30/17 4193   Gross per 24 hour   Intake           1401.3 ml   Output              950 ml   Net            451.3 ml        Physical Examination:             Constitutional:  No acute distress, cooperative, pleasant    ENT:  Oral mucous moist, oropharynx benign. Neck supple,    Resp:  CTA bilaterally. No wheezing/rhonchi/rales. No accessory muscle use   CV:  Regular rhythm, normal rate, no murmurs, gallops, rubs    GI:  Soft, non distended, non tender. normoactive bowel sounds, no hepatosplenomegaly     Musculoskeletal:  No edema, left forearm PPD site no reaction    Neurologic:  Moves all extremities.   AAOx3, CN II-XII reviewed     Skin:  Good turgor, no rashes or ulcers       Data Review:    Review and/or order of clinical lab test      Labs:     Recent Labs      09/30/17   0642  09/29/17   0541   WBC  7.7  7.6   HGB  7.9*  6.4*   HCT  25.6*  21.7*   PLT  997*  958*     Recent Labs      09/30/17   0642  09/29/17   0541  09/28/17   1517   NA  135*  143  140   K  4.1  3.3*  3.5   CL  107 115*  107   CO2  19*  19*  23   BUN  12  10  12   CREA  0.75  0.56*  0.76   GLU  113*  75  80   CA  8.9  7.0*  8.5     Recent Labs      09/30/17   0642  09/28/17   1517   SGOT  11*  9*   ALT  10*  10*   AP  71  68   TBILI  0.3  <0.1*   TP  7.4  7.2   ALB  2.6*  2.7*   GLOB  4.8*  4.5*   LPSE   --   132     Recent Labs      09/28/17   1836   INR  1.0   PTP  10.4      Recent Labs      09/29/17   0541   TIBC  324   PSAT  9*      No results found for: FOL, RBCF   No results for input(s): PH, PCO2, PO2 in the last 72 hours.   Recent Labs      09/28/17   1517   TROIQ  <0.04     No results found for: CHOL, CHOLX, CHLST, CHOLV, HDL, LDL, LDLC, DLDLP, TGLX, TRIGL, TRIGP, CHHD, CHHDX  Lab Results   Component Value Date/Time    Glucose (POC) 173 09/29/2017 10:33 PM     Lab Results   Component Value Date/Time    Color YELLOW/STRAW 07/18/2017 12:10 AM    Appearance CLEAR 07/18/2017 12:10 AM    Specific gravity 1.030 07/18/2017 12:10 AM    pH (UA) 5.5 07/18/2017 12:10 AM    Protein TRACE 07/18/2017 12:10 AM    Glucose NEGATIVE  07/18/2017 12:10 AM    Ketone TRACE 07/18/2017 12:10 AM    Urobilinogen 1.0 07/18/2017 12:10 AM    Nitrites NEGATIVE  07/18/2017 12:10 AM    Leukocyte Esterase NEGATIVE  07/18/2017 12:10 AM    Epithelial cells FEW 07/18/2017 12:10 AM    Bacteria NEGATIVE  07/18/2017 12:10 AM    WBC 0-4 07/18/2017 12:10 AM    RBC 0-5 07/18/2017 12:10 AM         Medications Reviewed:     Current Facility-Administered Medications   Medication Dose Route Frequency    0.9% sodium chloride infusion 250 mL  250 mL IntraVENous PRN    tuberculin injection 5 Units  5 Units IntraDERMal ONCE    sodium chloride (NS) flush 5-10 mL  5-10 mL IntraVENous Q8H    sodium chloride (NS) flush 5-10 mL  5-10 mL IntraVENous PRN    ondansetron (ZOFRAN) injection 4 mg  4 mg IntraVENous Q4H PRN    pantoprazole (PROTONIX) 40 mg in sodium chloride 0.9 % 10 mL injection  40 mg IntraVENous Q24H    methylPREDNISolone (PF) (SOLU-MEDROL) injection 60 mg  60 mg IntraVENous Q8H    levoFLOXacin (LEVAQUIN) 750 mg in D5W IVPB  750 mg IntraVENous Q24H    metroNIDAZOLE (FLAGYL) IVPB premix 500 mg  500 mg IntraVENous Q8H    0.9% sodium chloride infusion  100 mL/hr IntraVENous CONTINUOUS    mesalamine DR (ASACOL HD) tablet 800 mg  800 mg Oral TID WITH MEALS    HYDROmorphone (PF) (DILAUDID) injection 1 mg  1 mg IntraVENous Q4H PRN    0.9% sodium chloride infusion 250 mL  250 mL IntraVENous PRN    diazePAM (VALIUM) injection 5 mg  5 mg IntraVENous Q4H PRN     ______________________________________________________________________  EXPECTED LENGTH OF STAY: - - -  ACTUAL LENGTH OF STAY:          2                 Lore Shepard MD

## 2017-09-30 NOTE — PROGRESS NOTES
Bedside shift change report given to [address] [place] [address] Denise (oncoming nurse) by Amol Mcgregor (offgoing nurse). Report included the following information SBAR, Kardex, ED Summary, Procedure Summary, Intake/Output, MAR and Recent Results.

## 2017-09-30 NOTE — ANESTHESIA POSTPROCEDURE EVALUATION
Post-Anesthesia Evaluation and Assessment    Patient: Danna Mccormack MRN: [id number]  SSN: [id number]    YOB: 1975  Age: 40 y.o. Sex: male       Cardiovascular Function/Vital Signs  Visit Vitals    /64 (BP 1 Location: Left arm, BP Patient Position: At rest)    Pulse 70    Temp 36.4 °C (97.6 °F)    Resp 18    Ht 6' (1.829 m)    Wt 58.3 kg (128 lb 8.5 oz)    SpO2 99%    BMI 17.43 kg/m2       Patient is status post MAC anesthesia for Procedure(s):  ESOPHAGOGASTRODUODENOSCOPY (EGD). Nausea/Vomiting: None    Postoperative hydration reviewed and adequate. Pain:  Pain Scale 1: Numeric (0 - 10) (09/30/17 0016)  Pain Intensity 1: 0 (09/30/17 0016)   Managed    Neurological Status: At baseline    Mental Status and Level of Consciousness: Arousable    Pulmonary Status:   O2 Device: Room air (09/29/17 2125)   Adequate oxygenation and airway patent    Complications related to anesthesia: None    Post-anesthesia assessment completed.  No concerns    Signed By: Keri Mae MD     September 30, 2017

## 2017-09-30 NOTE — PROGRESS NOTES
Problem: Falls - Risk of  Goal: *Absence of Falls  Document Nancy Fall Risk and appropriate interventions in the flowsheet.    Outcome: Progressing Towards Goal  Fall Risk Interventions:              Medication Interventions: Teach patient to arise slowly           History of Falls Interventions: Door open when patient unattended

## 2017-09-30 NOTE — PROGRESS NOTES
Bedside and Verbal shift change report given to Ann Durham (oncoming nurse) by Luisito Brian (offgoing nurse). Report included the following information SBAR, Kardex, MAR and Recent Results.

## 2017-09-30 NOTE — PROGRESS NOTES
Problem: Falls - Risk of  Goal: *Absence of Falls  Document Nancy Fall Risk and appropriate interventions in the flowsheet.    Outcome: Progressing Towards Goal  Fall Risk Interventions:              Medication Interventions: Patient to call before getting OOB           History of Falls Interventions: Bed/chair exit alarm

## 2017-09-30 NOTE — PROGRESS NOTES
[address]  217 [facility] 140 [facility], [address]  [phone]                GI PROGRESS NOTE      NAME:   Kaiden Stephens   :    1975   MRN:    [id number]     Assessment/Plan   GI bleeding- stable currently. Continue PPI  Fistulizing Crohn's. Continue IV steroids for now. CT did not show any evidence of fistula     Patient Active Problem List   Diagnosis Code    Enterocolitis K52.9    Acute blood loss anemia D62    GI bleed K92.2    Crohn disease ([facility] [address] K50.90       Subjective:     Kaiden Stephens is a 40 y.o.  male who is feeling little better. Had a brown BM. Review of Systems    Constitutional: negative fever, negative chills, negative weight loss  Eyes:   negative visual changes  ENT:   negative sore throat, tongue or lip swelling  Respiratory:  negative cough, negative dyspnea  Cards:  negative for chest pain, palpitations, lower extremity edema  GI:   See HPI  :  negative for frequency, dysuria  Integument:  negative for rash and pruritus  Heme:  negative for easy bruising and gum/nose bleeding  Musculoskel: negative for myalgias,  back pain and muscle weakness  Neuro: negative for headaches, dizziness, vertigo  Psych:  negative for feelings of anxiety, depression           Objective:     VITALS:   Last 24hrs VS reviewed since prior hospitalist progress note.  Most recent are:  Visit Vitals    /67 (BP 1 Location: Left arm, BP Patient Position: At rest)    Pulse 66    Temp 98.9 °F (37.2 °C)    Resp 18    Ht 6' (1.829 m)    Wt 58.3 kg (128 lb 8.5 oz)    SpO2 100%    BMI 17.43 kg/m2       Intake/Output Summary (Last 24 hours) at 17 1636  Last data filed at 17 1536   Gross per 24 hour   Intake           2246.3 ml   Output             1325 ml   Net            921.3 ml        PHYSICAL EXAM:  General   well developed, well nourished, appears stated age, in no acute distress  EENT  Normocephalic, Atraumatic, PERRLA, EOMI, sclera clear, nares clear, pharynx normal  Neck   Supple without nodes or mass. No thyromegaly or bruit  Respiratory   Clear To Auscultation bilaterally - no wheezes, rales, rhonchi, or crackles  Cardiology  Regular Rate and Rythmn  - no murmurs, rubs or gallops  Abdominal  Soft, non-tender, non-distended, positive bowel sounds, no hepatosplenomegaly, no palpable mass  Extremities  No clubbing, cyanosis, or edema. Pulses intact. Back  No spinal or muscle pain. No CVAT. Skin  Normal skin turgor. No rashes or skin ulcers noted  Neurological  No focal neurological deficits noted  Psychological  Oriented x 3. Normal affect. Lab Data   Recent Results (from the past 12 hour(s))   CBC W/O DIFF    Collection Time: 09/30/17  6:42 AM   Result Value Ref Range    WBC 7.7 4.1 - 11.1 K/uL    RBC 3.85 (L) 4.10 - 5.70 M/uL    HGB 7.9 (L) 12.1 - 17.0 g/dL    HCT 25.6 (L) 36.6 - 50.3 %    MCV 66.5 (L) 80.0 - 99.0 FL    MCH 20.5 (L) 26.0 - 34.0 PG    MCHC 30.9 30.0 - 36.5 g/dL    PLATELET 789 (H) 435 - 515 K/uL   METABOLIC PANEL, COMPREHENSIVE    Collection Time: 09/30/17  6:42 AM   Result Value Ref Range    Sodium 135 (L) 136 - 145 mmol/L    Potassium 4.1 3.5 - 5.1 mmol/L    Chloride 107 97 - 108 mmol/L    CO2 19 (L) 21 - 32 mmol/L    Anion gap 9 5 - 15 mmol/L    Glucose 113 (H) 65 - 100 mg/dL    BUN 12 6 - 20 MG/DL    Creatinine 0.75 0.70 - 1.30 MG/DL    BUN/Creatinine ratio 16 12 - 20      GFR est AA >60 >60 ml/min/1.73m2    GFR est non-AA >60 >60 ml/min/1.73m2    Calcium 8.9 8.5 - 10.1 MG/DL    Bilirubin, total 0.3 0.2 - 1.0 MG/DL    ALT (SGPT) 10 (L) 12 - 78 U/L    AST (SGOT) 11 (L) 15 - 37 U/L    Alk.  phosphatase 71 45 - 117 U/L    Protein, total 7.4 6.4 - 8.2 g/dL    Albumin 2.6 (L) 3.5 - 5.0 g/dL    Globulin 4.8 (H) 2.0 - 4.0 g/dL    A-G Ratio 0.5 (L) 1.1 - 2.2     GLUCOSE, POC    Collection Time: 09/30/17 11:09 AM   Result Value Ref Range    Glucose (POC) 183 (H) 65 - 100 mg/dL    Performed by [address] [place] [address] Collection Time: 09/30/17  4:27 PM   Result Value Ref Range    Glucose (POC) 138 (H) 65 - 100 mg/dL    Performed by Falguni Marin          Medications: Reviewed    PMH/ reviewed - no change compared to H&P  Attending Physician: Rexann Lennox, MD   Date/Time:  9/30/2017

## 2017-09-30 NOTE — PROGRESS NOTES
General Surgery at [address] [place] [address] denies abdominal pain, tolerating diet. Denies hematochezia. CT last night reviewed, no evidence of fistula. PE:   Visit Vitals    /83 (BP 1 Location: Right arm, BP Patient Position: During activity)  Comment (BP Patient Position): pt eating    Pulse 74    Temp 98.7 °F (37.1 °C)    Resp 18    Ht 6' (1.829 m)    Wt 128 lb 8.5 oz (58.3 kg)    SpO2 100%    BMI 17.43 kg/m2      Gen NAD  Abd Flat, soft, nontender     Hospital Problems as of 9/30/2017  Date Reviewed: 9/28/2017          Codes Class Noted - Resolved POA    Crohn disease ([facility] [address] ICD-10-CM: K50.90  ICD-9-CM: 555.9  9/29/2017 - Present Yes        * (Principal)Acute blood loss anemia ICD-10-CM: D62  ICD-9-CM: 285.1  9/28/2017 - Present Unknown        GI bleed ICD-10-CM: K92.2  ICD-9-CM: 578.9  9/28/2017 - Present Unknown        Enterocolitis ICD-10-CM: K52.9  ICD-9-CM: 558.9  4/29/2017 - Present              No evidence of fistula and no diarrhea or hematochezia. Will continue to follow peripherally.     Signed By: Arsh See MD     September 30, 2017

## 2017-10-01 LAB
ABO + RH BLD: NORMAL
ALBUMIN SERPL-MCNC: 2.5 G/DL (ref 3.5–5)
ALBUMIN/GLOB SERPL: 0.6 {RATIO} (ref 1.1–2.2)
ALP SERPL-CCNC: 67 U/L (ref 45–117)
ALT SERPL-CCNC: 9 U/L (ref 12–78)
ANION GAP SERPL CALC-SCNC: 8 MMOL/L (ref 5–15)
AST SERPL-CCNC: 10 U/L (ref 15–37)
BILIRUB SERPL-MCNC: 0.1 MG/DL (ref 0.2–1)
BLD PROD TYP BPU: NORMAL
BLOOD GROUP ANTIBODIES SERPL: NORMAL
BPU ID: NORMAL
BUN SERPL-MCNC: 13 MG/DL (ref 6–20)
BUN/CREAT SERPL: 17 (ref 12–20)
CALCIUM SERPL-MCNC: 8.5 MG/DL (ref 8.5–10.1)
CHLORIDE SERPL-SCNC: 107 MMOL/L (ref 97–108)
CO2 SERPL-SCNC: 21 MMOL/L (ref 21–32)
CREAT SERPL-MCNC: 0.78 MG/DL (ref 0.7–1.3)
CROSSMATCH RESULT,%XM: NORMAL
GLOBULIN SER CALC-MCNC: 4.4 G/DL (ref 2–4)
GLUCOSE BLD STRIP.AUTO-MCNC: 126 MG/DL (ref 65–100)
GLUCOSE BLD STRIP.AUTO-MCNC: 127 MG/DL (ref 65–100)
GLUCOSE BLD STRIP.AUTO-MCNC: 157 MG/DL (ref 65–100)
GLUCOSE BLD STRIP.AUTO-MCNC: 165 MG/DL (ref 65–100)
GLUCOSE SERPL-MCNC: 124 MG/DL (ref 65–100)
HCT VFR BLD AUTO: 23.8 % (ref 36.6–50.3)
HGB BLD-MCNC: 7.4 G/DL (ref 12.1–17)
MCH RBC QN AUTO: 20.7 PG (ref 26–34)
MCHC RBC AUTO-ENTMCNC: 31.1 G/DL (ref 30–36.5)
MCV RBC AUTO: 66.7 FL (ref 80–99)
PLATELET # BLD AUTO: 892 K/UL (ref 150–400)
POTASSIUM SERPL-SCNC: 4.1 MMOL/L (ref 3.5–5.1)
PROT SERPL-MCNC: 6.9 G/DL (ref 6.4–8.2)
RBC # BLD AUTO: 3.57 M/UL (ref 4.1–5.7)
SERVICE CMNT-IMP: ABNORMAL
SODIUM SERPL-SCNC: 136 MMOL/L (ref 136–145)
SPECIMEN EXP DATE BLD: NORMAL
STATUS OF UNIT,%ST: NORMAL
UNIT DIVISION, %UDIV: 0
WBC # BLD AUTO: 16.6 K/UL (ref 4.1–11.1)

## 2017-10-01 PROCEDURE — 65270000029 HC RM PRIVATE

## 2017-10-01 PROCEDURE — 74011250637 HC RX REV CODE- 250/637: Performed by: FAMILY MEDICINE

## 2017-10-01 PROCEDURE — 74011000250 HC RX REV CODE- 250: Performed by: FAMILY MEDICINE

## 2017-10-01 PROCEDURE — 36415 COLL VENOUS BLD VENIPUNCTURE: CPT | Performed by: HOSPITALIST

## 2017-10-01 PROCEDURE — 85027 COMPLETE CBC AUTOMATED: CPT | Performed by: HOSPITALIST

## 2017-10-01 PROCEDURE — 74011250636 HC RX REV CODE- 250/636: Performed by: FAMILY MEDICINE

## 2017-10-01 PROCEDURE — 82962 GLUCOSE BLOOD TEST: CPT

## 2017-10-01 PROCEDURE — C9113 INJ PANTOPRAZOLE SODIUM, VIA: HCPCS | Performed by: FAMILY MEDICINE

## 2017-10-01 PROCEDURE — 80053 COMPREHEN METABOLIC PANEL: CPT | Performed by: HOSPITALIST

## 2017-10-01 RX ADMIN — MESALAMINE 800 MG: 800 TABLET, DELAYED RELEASE ORAL at 16:40

## 2017-10-01 RX ADMIN — Medication 10 ML: at 06:53

## 2017-10-01 RX ADMIN — Medication 10 ML: at 22:20

## 2017-10-01 RX ADMIN — SODIUM CHLORIDE 100 ML/HR: 900 INJECTION, SOLUTION INTRAVENOUS at 01:36

## 2017-10-01 RX ADMIN — METRONIDAZOLE 500 MG: 500 INJECTION, SOLUTION INTRAVENOUS at 21:51

## 2017-10-01 RX ADMIN — METHYLPREDNISOLONE SODIUM SUCCINATE 60 MG: 125 INJECTION, POWDER, FOR SOLUTION INTRAMUSCULAR; INTRAVENOUS at 21:52

## 2017-10-01 RX ADMIN — MESALAMINE 800 MG: 800 TABLET, DELAYED RELEASE ORAL at 11:49

## 2017-10-01 RX ADMIN — HYDROMORPHONE HYDROCHLORIDE 1 MG: 1 INJECTION, SOLUTION INTRAMUSCULAR; INTRAVENOUS; SUBCUTANEOUS at 22:20

## 2017-10-01 RX ADMIN — Medication 10 ML: at 21:52

## 2017-10-01 RX ADMIN — METRONIDAZOLE 500 MG: 500 INJECTION, SOLUTION INTRAVENOUS at 06:55

## 2017-10-01 RX ADMIN — LEVOFLOXACIN 750 MG: 5 INJECTION, SOLUTION INTRAVENOUS at 21:51

## 2017-10-01 RX ADMIN — METHYLPREDNISOLONE SODIUM SUCCINATE 60 MG: 125 INJECTION, POWDER, FOR SOLUTION INTRAMUSCULAR; INTRAVENOUS at 14:00

## 2017-10-01 RX ADMIN — METRONIDAZOLE 500 MG: 500 INJECTION, SOLUTION INTRAVENOUS at 14:47

## 2017-10-01 RX ADMIN — SODIUM CHLORIDE 40 MG: 9 INJECTION INTRAMUSCULAR; INTRAVENOUS; SUBCUTANEOUS at 21:51

## 2017-10-01 RX ADMIN — Medication 10 ML: at 14:47

## 2017-10-01 RX ADMIN — MESALAMINE 800 MG: 800 TABLET, DELAYED RELEASE ORAL at 07:05

## 2017-10-01 RX ADMIN — METHYLPREDNISOLONE SODIUM SUCCINATE 60 MG: 125 INJECTION, POWDER, FOR SOLUTION INTRAMUSCULAR; INTRAVENOUS at 06:53

## 2017-10-01 NOTE — PROGRESS NOTES
Documented on 10/1/17:    Spiritual Care Partner Volunteer visited patient in [address] on 9/30/17. Documented by:  Abril Wade M.Div.    Paging Service [phone])

## 2017-10-01 NOTE — PROGRESS NOTES
Hospitalist Progress Note  Sakshi White MD  Answering service: [phone] from in house phone  Cell: [phone]      Date of Service:  10/1/2017  NAME:  Edu Leos  :  1975  MRN:  [id number]      Admission Summary:   The patient is a 29-year-old gentleman with past medical history of inflammatory bowel disease, history of alcohol abuse, history of tobacco abuse, who presents to the hospital with abdominal pain and weakness.       Interval history / Subjective: Bowel movement was ,      Assessment & Plan:     Acute blood loss anemia likely related to inflammatory bowel disease   -continue iv protonix iv, solumedrol, abxs and IVF  -Hgb was to 3.4, s/p 3 units PRBC and Hgb was 6.4, and transfuse to one more unit on ,   - repeated Hgb 7.7, monitor H/H  -s/p EGD on  normal esophagus, stomach and duodenum and jejunum  -on GI lite diet  -GI on board     Abdominal pain likely due to acute crohn's flare up  -on iv solumedrol, mesalamine, metronidazole, levaquin and IVF  -CT abdomen and pelvis thickening and dilatation of the right colon and transverse colon to  the level of the splenic flexure where there is more extensive thickening and a presumed stricture. Immediately inferior to the colon at this level, there is abnormal soft tissue which extends to the jejunal loop, worrisome for a colon enteric fistula.  Nondistended but thickened income and distal terminal ileum.  -no evidence of fistula  -PPD test for possible biologic treatment, no redness or induration so far  -Chest x ray on  normal    Iron deficiency likely related to inflammatory bowel disease  -transfused total of 4 units of PRBC  -patient with thrombocytosis, repeat iron level later      Hypokalemia   -replaced and resolved     Chronic thrombocytosis   -platelet high but improving, 892 from 1115     Tobacco abuse   -advised to stop alcohol    Leukocytosis due to steroid  -no fever    Non complaint  -patient left AMA [facility] from [facility]    Code status: Full Code  DVT prophylaxis: SCD    Care Plan discussed with: Patient/Family, Nurse and   Disposition: Home w/Family     Hospital Problems  Date Reviewed: 9/28/2017          Codes Class Noted POA    Crohn disease ([facility] [address] ICD-10-CM: K50.90  ICD-9-CM: 555.9  9/29/2017 Yes        * (Principal)Acute blood loss anemia ICD-10-CM: D62  ICD-9-CM: 285.1  9/28/2017 Unknown        GI bleed ICD-10-CM: K92.2  ICD-9-CM: 578.9  9/28/2017 Unknown              Vital Signs:    Last 24hrs VS reviewed since prior progress note. Most recent are:  Visit Vitals    /66 (BP 1 Location: Left arm, BP Patient Position: At rest)    Pulse 60    Temp 98.2 °F (36.8 °C)    Resp 16    Ht 6' (1.829 m)    Wt 58.3 kg (128 lb 8.5 oz)    SpO2 100%    BMI 17.43 kg/m2         Intake/Output Summary (Last 24 hours) at 10/01/17 1035  Last data filed at 10/01/17 0747   Gross per 24 hour   Intake          3838.34 ml   Output             2475 ml   Net          1363.34 ml        Physical Examination:             Constitutional:  No acute distress, cooperative, pleasant    ENT:  Oral mucous moist, oropharynx benign. Neck supple,    Resp:  CTA bilaterally. No wheezing/rhonchi/rales. No accessory muscle use   CV:  Regular rhythm, normal rate, no murmurs, gallops, rubs    GI:  Soft, non distended, non tender. normoactive bowel sounds, no hepatosplenomegaly     Musculoskeletal:  No edema, left forearm PPD site no reaction    Neurologic:  Moves all extremities.   AAOx3, CN II-XII reviewed     Skin:  Good turgor, no rashes or ulcers       Data Review:    Review and/or order of clinical lab test      Labs:     Recent Labs      10/01/17   0318  09/30/17   0642   WBC  16.6*  7.7   HGB  7.4*  7.9*   HCT  23.8*  25.6*   PLT  892*  997*     Recent Labs      10/01/17   0318  09/30/17   0642  09/29/17   0541   NA  136  135* 143   K  4.1  4.1  3.3*   CL  107  107  115*   CO2  21  19*  19*   BUN  13  12  10   CREA  0.78  0.75  0.56*   GLU  124*  113*  75   CA  8.5  8.9  7.0*     Recent Labs      10/01/17   0318  09/30/17   0642  09/28/17   1517   SGOT  10*  11*  9*   ALT  9*  10*  10*   AP  67  71  68   TBILI  0.1*  0.3  <0.1*   TP  6.9  7.4  7.2   ALB  2.5*  2.6*  2.7*   GLOB  4.4*  4.8*  4.5*   LPSE   --    --   132     Recent Labs      09/28/17   1836   INR  1.0   PTP  10.4      Recent Labs      09/29/17   0541   TIBC  324   PSAT  9*      No results found for: FOL, RBCF   No results for input(s): PH, PCO2, PO2 in the last 72 hours.   Recent Labs      09/28/17   1517   TROIQ  <0.04     No results found for: CHOL, CHOLX, CHLST, CHOLV, HDL, LDL, LDLC, DLDLP, TGLX, TRIGL, TRIGP, CHHD, CHHDX  Lab Results   Component Value Date/Time    Glucose (POC) 126 10/01/2017 07:08 AM    Glucose (POC) 138 09/30/2017 04:27 PM    Glucose (POC) 183 09/30/2017 11:09 AM    Glucose (POC) 173 09/29/2017 10:33 PM     Lab Results   Component Value Date/Time    Color YELLOW/STRAW 07/18/2017 12:10 AM    Appearance CLEAR 07/18/2017 12:10 AM    Specific gravity 1.030 07/18/2017 12:10 AM    pH (UA) 5.5 07/18/2017 12:10 AM    Protein TRACE 07/18/2017 12:10 AM    Glucose NEGATIVE  07/18/2017 12:10 AM    Ketone TRACE 07/18/2017 12:10 AM    Urobilinogen 1.0 07/18/2017 12:10 AM    Nitrites NEGATIVE  07/18/2017 12:10 AM    Leukocyte Esterase NEGATIVE  07/18/2017 12:10 AM    Epithelial cells FEW 07/18/2017 12:10 AM    Bacteria NEGATIVE  07/18/2017 12:10 AM    WBC 0-4 07/18/2017 12:10 AM    RBC 0-5 07/18/2017 12:10 AM         Medications Reviewed:     Current Facility-Administered Medications   Medication Dose Route Frequency    0.9% sodium chloride infusion 250 mL  250 mL IntraVENous PRN    sodium chloride (NS) flush 5-10 mL  5-10 mL IntraVENous Q8H    sodium chloride (NS) flush 5-10 mL  5-10 mL IntraVENous PRN    ondansetron (ZOFRAN) injection 4 mg  4 mg IntraVENous Q4H PRN    pantoprazole (PROTONIX) 40 mg in sodium chloride 0.9 % 10 mL injection  40 mg IntraVENous Q24H    methylPREDNISolone (PF) (SOLU-MEDROL) injection 60 mg  60 mg IntraVENous Q8H    levoFLOXacin (LEVAQUIN) 750 mg in D5W IVPB  750 mg IntraVENous Q24H    metroNIDAZOLE (FLAGYL) IVPB premix 500 mg  500 mg IntraVENous Q8H    0.9% sodium chloride infusion  100 mL/hr IntraVENous CONTINUOUS    mesalamine DR (ASACOL HD) tablet 800 mg  800 mg Oral TID WITH MEALS    HYDROmorphone (PF) (DILAUDID) injection 1 mg  1 mg IntraVENous Q4H PRN    0.9% sodium chloride infusion 250 mL  250 mL IntraVENous PRN    diazePAM (VALIUM) injection 5 mg  5 mg IntraVENous Q4H PRN     ______________________________________________________________________  EXPECTED LENGTH OF STAY: - - -  ACTUAL LENGTH OF STAY:          3                 Amanda Christopher MD

## 2017-10-01 NOTE — PROGRESS NOTES
Problem: Falls - Risk of  Goal: *Absence of Falls  Document Nancy Fall Risk and appropriate interventions in the flowsheet.    Outcome: Progressing Towards Goal  Fall Risk Interventions:              Medication Interventions: Patient to call before getting OOB, Teach patient to arise slowly           History of Falls Interventions: Door open when patient unattended

## 2017-10-01 NOTE — PROGRESS NOTES
Bedside shift change report given to Marquez Hobbs RN (oncoming nurse) by ROSEANNE Sprague (offgoing nurse). Report included the following information SBAR and Kardex.

## 2017-10-01 NOTE — PROGRESS NOTES
Bedside shift change report given to Fareed Chiu RN and Jenni Miller RN (oncoming nurse) by Araseli Chakraborty RN (offgoing nurse). Report included the following information SBAR, Kardex, ED Summary, Intake/Output, MAR and Recent Results.

## 2017-10-01 NOTE — PROGRESS NOTES
[address]  217 [facility] 140 [facility], [address]  [phone]                GI PROGRESS NOTE      NAME:   Fay Le   :    1975   MRN:    [id number]     Assessment/Plan   GI bleeding- stable currently. Continue PPI  Fistulizing Crohn's. Continue IV steroids for now. CT did not show any evidence of fistula  Leucosytosis- likely related to steroids. Would monitor closely though  Dr Dorian Fajardo to follow on Monday     Patient Active Problem List   Diagnosis Code    Enterocolitis K52.9    Acute blood loss anemia D62    GI bleed K92.2    Crohn disease ([facility] [address] K50.90       Subjective:     Fay Le is a 40 y.o.  male who had no further bleeding. Last BM yesterday. Review of Systems    Constitutional: negative fever, negative chills, negative weight loss  Eyes:   negative visual changes  ENT:   negative sore throat, tongue or lip swelling  Respiratory:  negative cough, negative dyspnea  Cards:  negative for chest pain, palpitations, lower extremity edema  GI:   See HPI  :  negative for frequency, dysuria  Integument:  negative for rash and pruritus  Heme:  negative for easy bruising and gum/nose bleeding  Musculoskel: negative for myalgias,  back pain and muscle weakness  Neuro: negative for headaches, dizziness, vertigo  Psych:  negative for feelings of anxiety, depression           Objective:     VITALS:   Last 24hrs VS reviewed since prior hospitalist progress note.  Most recent are:  Visit Vitals    /66 (BP 1 Location: Left arm, BP Patient Position: At rest)    Pulse 60    Temp 98.2 °F (36.8 °C)    Resp 16    Ht 6' (1.829 m)    Wt 58.3 kg (128 lb 8.5 oz)    SpO2 100%    BMI 17.43 kg/m2       Intake/Output Summary (Last 24 hours) at 10/01/17 1246  Last data filed at 10/01/17 0814   Gross per 24 hour   Intake          4078.34 ml   Output             2475 ml   Net          1603.34 ml        PHYSICAL EXAM:  General   well developed, well nourished, appears stated age, in no acute distress  EENT  Normocephalic, Atraumatic, PERRLA, EOMI, sclera clear, nares clear, pharynx normal  Neck   Supple without nodes or mass. No thyromegaly or bruit  Respiratory   Clear To Auscultation bilaterally - no wheezes, rales, rhonchi, or crackles  Cardiology  Regular Rate and Rythmn  - no murmurs, rubs or gallops  Abdominal  Soft, non-tender, non-distended, positive bowel sounds, no hepatosplenomegaly, no palpable mass  Extremities  No clubbing, cyanosis, or edema. Pulses intact. Back  No spinal or muscle pain. No CVAT. Skin  Normal skin turgor. No rashes or skin ulcers noted  Neurological  No focal neurological deficits noted  Psychological  Oriented x 3. Normal affect. Lab Data   Recent Results (from the past 12 hour(s))   CBC W/O DIFF    Collection Time: 10/01/17  3:18 AM   Result Value Ref Range    WBC 16.6 (H) 4.1 - 11.1 K/uL    RBC 3.57 (L) 4.10 - 5.70 M/uL    HGB 7.4 (L) 12.1 - 17.0 g/dL    HCT 23.8 (L) 36.6 - 50.3 %    MCV 66.7 (L) 80.0 - 99.0 FL    MCH 20.7 (L) 26.0 - 34.0 PG    MCHC 31.1 30.0 - 36.5 g/dL    PLATELET 762 (H) 214 - 103 K/uL   METABOLIC PANEL, COMPREHENSIVE    Collection Time: 10/01/17  3:18 AM   Result Value Ref Range    Sodium 136 136 - 145 mmol/L    Potassium 4.1 3.5 - 5.1 mmol/L    Chloride 107 97 - 108 mmol/L    CO2 21 21 - 32 mmol/L    Anion gap 8 5 - 15 mmol/L    Glucose 124 (H) 65 - 100 mg/dL    BUN 13 6 - 20 MG/DL    Creatinine 0.78 0.70 - 1.30 MG/DL    BUN/Creatinine ratio 17 12 - 20      GFR est AA >60 >60 ml/min/1.73m2    GFR est non-AA >60 >60 ml/min/1.73m2    Calcium 8.5 8.5 - 10.1 MG/DL    Bilirubin, total 0.1 (L) 0.2 - 1.0 MG/DL    ALT (SGPT) 9 (L) 12 - 78 U/L    AST (SGOT) 10 (L) 15 - 37 U/L    Alk.  phosphatase 67 45 - 117 U/L    Protein, total 6.9 6.4 - 8.2 g/dL    Albumin 2.5 (L) 3.5 - 5.0 g/dL    Globulin 4.4 (H) 2.0 - 4.0 g/dL    A-G Ratio 0.6 (L) 1.1 - 2.2     GLUCOSE, POC    Collection Time: 10/01/17  7:08 AM   Result Value Ref Range    Glucose (POC) 126 (H) 65 - 100 mg/dL    Performed by Wilber Villalobos    GLUCOSE, POC    Collection Time: 10/01/17 11:27 AM   Result Value Ref Range    Glucose (POC) 165 (H) 65 - 100 mg/dL    Performed by Lilliam Ferrell          Medications: Reviewed    PMH/ reviewed - no change compared to H&P  Attending Physician: Monica Prince MD   Date/Time:  10/1/2017

## 2017-10-02 ENCOUNTER — APPOINTMENT (OUTPATIENT)
Dept: GENERAL RADIOLOGY | Age: 42
DRG: 386 | End: 2017-10-02
Attending: NURSE PRACTITIONER
Payer: MEDICAID

## 2017-10-02 ENCOUNTER — APPOINTMENT (OUTPATIENT)
Dept: CT IMAGING | Age: 42
DRG: 386 | End: 2017-10-02
Attending: NURSE PRACTITIONER
Payer: MEDICAID

## 2017-10-02 LAB
ANION GAP SERPL CALC-SCNC: 9 MMOL/L (ref 5–15)
BUN SERPL-MCNC: 11 MG/DL (ref 6–20)
BUN/CREAT SERPL: 17 (ref 12–20)
CALCIUM SERPL-MCNC: 8.4 MG/DL (ref 8.5–10.1)
CHLORIDE SERPL-SCNC: 107 MMOL/L (ref 97–108)
CO2 SERPL-SCNC: 23 MMOL/L (ref 21–32)
CREAT SERPL-MCNC: 0.65 MG/DL (ref 0.7–1.3)
GLUCOSE BLD STRIP.AUTO-MCNC: 107 MG/DL (ref 65–100)
GLUCOSE BLD STRIP.AUTO-MCNC: 110 MG/DL (ref 65–100)
GLUCOSE BLD STRIP.AUTO-MCNC: 114 MG/DL (ref 65–100)
GLUCOSE BLD STRIP.AUTO-MCNC: 126 MG/DL (ref 65–100)
GLUCOSE SERPL-MCNC: 105 MG/DL (ref 65–100)
HCT VFR BLD AUTO: 20.9 % (ref 36.6–50.3)
HGB BLD-MCNC: 6.4 G/DL (ref 12.1–17)
MCH RBC QN AUTO: 20.4 PG (ref 26–34)
MCHC RBC AUTO-ENTMCNC: 30.6 G/DL (ref 30–36.5)
MCV RBC AUTO: 66.8 FL (ref 80–99)
PLATELET # BLD AUTO: 811 K/UL (ref 150–400)
POTASSIUM SERPL-SCNC: 3.9 MMOL/L (ref 3.5–5.1)
RBC # BLD AUTO: 3.13 M/UL (ref 4.1–5.7)
SERVICE CMNT-IMP: ABNORMAL
SODIUM SERPL-SCNC: 139 MMOL/L (ref 136–145)
WBC # BLD AUTO: 16.1 K/UL (ref 4.1–11.1)

## 2017-10-02 PROCEDURE — 74011250636 HC RX REV CODE- 250/636

## 2017-10-02 PROCEDURE — P9016 RBC LEUKOCYTES REDUCED: HCPCS | Performed by: HOSPITALIST

## 2017-10-02 PROCEDURE — 36430 TRANSFUSION BLD/BLD COMPNT: CPT

## 2017-10-02 PROCEDURE — 74011000258 HC RX REV CODE- 258: Performed by: HOSPITALIST

## 2017-10-02 PROCEDURE — 74011250636 HC RX REV CODE- 250/636: Performed by: FAMILY MEDICINE

## 2017-10-02 PROCEDURE — 74011000250 HC RX REV CODE- 250

## 2017-10-02 PROCEDURE — 74011000250 HC RX REV CODE- 250: Performed by: FAMILY MEDICINE

## 2017-10-02 PROCEDURE — 86900 BLOOD TYPING SEROLOGIC ABO: CPT | Performed by: HOSPITALIST

## 2017-10-02 PROCEDURE — 36415 COLL VENOUS BLD VENIPUNCTURE: CPT | Performed by: HOSPITALIST

## 2017-10-02 PROCEDURE — 65270000029 HC RM PRIVATE

## 2017-10-02 PROCEDURE — 86923 COMPATIBILITY TEST ELECTRIC: CPT | Performed by: HOSPITALIST

## 2017-10-02 PROCEDURE — 74177 CT ABD & PELVIS W/CONTRAST: CPT

## 2017-10-02 PROCEDURE — 74011636320 HC RX REV CODE- 636/320: Performed by: HOSPITALIST

## 2017-10-02 PROCEDURE — 82962 GLUCOSE BLOOD TEST: CPT

## 2017-10-02 PROCEDURE — 74011250637 HC RX REV CODE- 250/637: Performed by: FAMILY MEDICINE

## 2017-10-02 PROCEDURE — 80048 BASIC METABOLIC PNL TOTAL CA: CPT | Performed by: HOSPITALIST

## 2017-10-02 PROCEDURE — 85027 COMPLETE CBC AUTOMATED: CPT | Performed by: HOSPITALIST

## 2017-10-02 PROCEDURE — 74011250637 HC RX REV CODE- 250/637: Performed by: NURSE PRACTITIONER

## 2017-10-02 RX ORDER — PANTOPRAZOLE SODIUM 40 MG/1
40 TABLET, DELAYED RELEASE ORAL
Status: DISCONTINUED | OUTPATIENT
Start: 2017-10-02 | End: 2017-10-03 | Stop reason: HOSPADM

## 2017-10-02 RX ORDER — SODIUM CHLORIDE 0.9 % (FLUSH) 0.9 %
10 SYRINGE (ML) INJECTION
Status: COMPLETED | OUTPATIENT
Start: 2017-10-02 | End: 2017-10-02

## 2017-10-02 RX ORDER — SODIUM CHLORIDE 9 MG/ML
250 INJECTION, SOLUTION INTRAVENOUS AS NEEDED
Status: DISCONTINUED | OUTPATIENT
Start: 2017-10-02 | End: 2017-10-03 | Stop reason: HOSPADM

## 2017-10-02 RX ADMIN — METRONIDAZOLE 500 MG: 500 INJECTION, SOLUTION INTRAVENOUS at 14:20

## 2017-10-02 RX ADMIN — LEVOFLOXACIN 750 MG: 5 INJECTION, SOLUTION INTRAVENOUS at 22:19

## 2017-10-02 RX ADMIN — IOPAMIDOL 100 ML: 755 INJECTION, SOLUTION INTRAVENOUS at 21:47

## 2017-10-02 RX ADMIN — Medication 10 ML: at 20:56

## 2017-10-02 RX ADMIN — MESALAMINE 800 MG: 800 TABLET, DELAYED RELEASE ORAL at 17:10

## 2017-10-02 RX ADMIN — METHYLPREDNISOLONE SODIUM SUCCINATE 60 MG: 125 INJECTION, POWDER, FOR SOLUTION INTRAMUSCULAR; INTRAVENOUS at 22:22

## 2017-10-02 RX ADMIN — PANTOPRAZOLE SODIUM 40 MG: 40 TABLET, DELAYED RELEASE ORAL at 11:38

## 2017-10-02 RX ADMIN — Medication 10 ML: at 14:21

## 2017-10-02 RX ADMIN — SODIUM CHLORIDE 100 ML: 900 INJECTION, SOLUTION INTRAVENOUS at 21:47

## 2017-10-02 RX ADMIN — PANTOPRAZOLE SODIUM 40 MG: 40 TABLET, DELAYED RELEASE ORAL at 17:14

## 2017-10-02 RX ADMIN — METRONIDAZOLE 500 MG: 500 INJECTION, SOLUTION INTRAVENOUS at 22:19

## 2017-10-02 RX ADMIN — MESALAMINE 800 MG: 800 TABLET, DELAYED RELEASE ORAL at 11:38

## 2017-10-02 RX ADMIN — Medication 10 ML: at 21:47

## 2017-10-02 RX ADMIN — HYDROMORPHONE HYDROCHLORIDE 1 MG: 1 INJECTION, SOLUTION INTRAMUSCULAR; INTRAVENOUS; SUBCUTANEOUS at 22:56

## 2017-10-02 RX ADMIN — METHYLPREDNISOLONE SODIUM SUCCINATE 60 MG: 125 INJECTION, POWDER, FOR SOLUTION INTRAMUSCULAR; INTRAVENOUS at 06:01

## 2017-10-02 RX ADMIN — Medication 10 ML: at 06:02

## 2017-10-02 RX ADMIN — Medication 10 ML: at 22:56

## 2017-10-02 RX ADMIN — Medication 10 ML: at 22:23

## 2017-10-02 RX ADMIN — METRONIDAZOLE 500 MG: 500 INJECTION, SOLUTION INTRAVENOUS at 06:01

## 2017-10-02 RX ADMIN — MESALAMINE 800 MG: 800 TABLET, DELAYED RELEASE ORAL at 09:14

## 2017-10-02 RX ADMIN — METHYLPREDNISOLONE SODIUM SUCCINATE 60 MG: 125 INJECTION, POWDER, FOR SOLUTION INTRAMUSCULAR; INTRAVENOUS at 14:20

## 2017-10-02 NOTE — PROGRESS NOTES
[address]  217 [facility] 140 [address]   [phone]                GI PROGRESS NOTE  Niraj Guan, [facility]  Work Cell: [phone]      NAME:   Halima Soria   :    1975   MRN:    [id number]     Assessment/Plan   1. Crohn's colitis - with stricturing and suspected fistula, worsening disease likely related to poor medication compliance. Repeat CT did not show fistula. Symptoms improved. Chest x-ray and PPD negative. - GI lite diet  - Continue IV steroids, antibiotics and mesalamine   - CT Enterography today  - Will initiate approval process to start Humira as outpatient  2. Severe anemia - likely multifactorial, s/p EGD which was negative. Had recurrent drop in H&H. Reports some minimal rectal bleeding.   - Monitor H&H and follow clinical course for any overt signs of bleeding  - Transfuse as needed  3. Weight loss - secondary to malnutrition from the above      Patient Active Problem List   Diagnosis Code    Enterocolitis K52.9    Acute blood loss anemia D62    GI bleed K92.2    [facility] ([facility] K50.90       Subjective:     Feeling better. Denies any abdominal pain. Tolerating diet without nausea or vomiting. Passing flatus and had normal stool with small amount of blood present. No fever or chills.        Review of Systems    Constitutional: negative fever, negative chills, negative weight loss  Eyes:   negative visual changes  ENT:   negative sore throat, tongue or lip swelling  Respiratory:  negative cough, negative dyspnea  Cards:  negative for chest pain, palpitations, lower extremity edema  GI:   See HPI  :  negative for frequency, dysuria  Integument:  negative for rash and pruritus  Heme:  negative for easy bruising and gum/nose bleeding  Musculoskel: negative for myalgias, back pain and muscle weakness  Neuro: negative for headaches, dizziness, vertigo  Psych:  negative for feelings of anxiety, depression     Objective:     VITALS:   Last 24hrs VS reviewed since prior hospitalist progress note. Most recent are:  Visit Vitals    /63 (BP 1 Location: Left arm, BP Patient Position: At rest)    Pulse 74    Temp 98.2 °F (36.8 °C)    Resp 16    Ht 6' (1.829 m)    Wt 58.3 kg (128 lb 8.5 oz)    SpO2 100%    BMI 17.43 kg/m2       Intake/Output Summary (Last 24 hours) at 10/02/17 1245  Last data filed at 10/02/17 0910   Gross per 24 hour   Intake          2418.58 ml   Output             2550 ml   Net          -131.42 ml        PHYSICAL EXAM:  General   well developed, thin, alert, in no acute distress  EENT  Normocephalic, Atraumatic, PERRLA, EOMI, sclera clear  Respiratory   Clear To Auscultation bilaterally - no wheezes, rales, rhonchi, or crackles  Cardiology  Regular Rate and Rythmn  - no murmurs, rubs or gallops  Abdominal  Soft, non-tender, non-distended, positive bowel sounds, no hepatosplenomegaly, no palpable mass  Extremities  No clubbing, cyanosis, or edema. Pulses intact. Neurological  No focal neurological deficits noted  Psychological  Oriented x 3. Normal affect.        Lab Data   Recent Results (from the past 12 hour(s))   CBC W/O DIFF    Collection Time: 10/02/17  4:04 AM   Result Value Ref Range    WBC 16.1 (H) 4.1 - 11.1 K/uL    RBC 3.13 (L) 4.10 - 5.70 M/uL    HGB 6.4 (L) 12.1 - 17.0 g/dL    HCT 20.9 (L) 36.6 - 50.3 %    MCV 66.8 (L) 80.0 - 99.0 FL    MCH 20.4 (L) 26.0 - 34.0 PG    MCHC 30.6 30.0 - 36.5 g/dL    PLATELET 322 (H) 486 - 605 K/uL   METABOLIC PANEL, BASIC    Collection Time: 10/02/17  4:04 AM   Result Value Ref Range    Sodium 139 136 - 145 mmol/L    Potassium 3.9 3.5 - 5.1 mmol/L    Chloride 107 97 - 108 mmol/L    CO2 23 21 - 32 mmol/L    Anion gap 9 5 - 15 mmol/L    Glucose 105 (H) 65 - 100 mg/dL    BUN 11 6 - 20 MG/DL    Creatinine 0.65 (L) 0.70 - 1.30 MG/DL    BUN/Creatinine ratio 17 12 - 20      GFR est AA >60 >60 ml/min/1.73m2    GFR est non-AA >60 >60 ml/min/1.73m2    Calcium 8.4 (L) 8.5 - 10.1 MG/DL GLUCOSE, POC    Collection Time: 10/02/17  6:55 AM   Result Value Ref Range    Glucose (POC) 110 (H) 65 - 100 mg/dL    Performed by Porsha Fat, POC    Collection Time: 10/02/17 11:29 AM   Result Value Ref Range    Glucose (POC) 114 (H) 65 - 100 mg/dL    Performed by Nelly Fuel          Medications: Reviewed    PMH/ reviewed - no change compared to H&P  Mid-Level Provider: Tyler Pacheco NP   Date/Time:  10/2/2017   I have examined the patient. I have reviewed the chart and agree with the documentation recorded by the NP, including the assessment, treatment plan, and disposition.       Corrinne Royals, MD

## 2017-10-02 NOTE — PROGRESS NOTES
Intravenous Proton Pump Inhibitor (Pantoprazole) Shortage Update    The IV pantoprazole for Blair Ibarra has been converted to PO pantoprazole in accordance with the [place]-Approved IV PPI Shortage Plan. IV pantoprazole will be reserved only for patients with clinically or endoscopically documented upper GI bleeding, confirmed diagnosis of H. pylori or acute erosive esophagitis in patients who are NPO. Please call the main inpatient pharmacy at [phone] with any questions.

## 2017-10-02 NOTE — PROGRESS NOTES
Intravenous Proton Pump Inhibitor (Pantoprazole) Shortage Update    The IV pantoprazole for Kaiden Stephens has been converted to PO pantoprazole in accordance with the [place]-Approved IV PPI Shortage Plan. IV pantoprazole will be reserved only for patients with clinically or endoscopically documented upper GI bleeding, confirmed diagnosis of H. pylori or acute erosive esophagitis in patients who are NPO. Please call the main inpatient pharmacy at [phone] with any questions.

## 2017-10-02 NOTE — PROGRESS NOTES
Hospitalist Progress Note  Kavitha Machado NP  Answering service: [phone] from in house phone  Cell: [phone]      Date of Service:  10/2/2017  NAME:  Israel Carrizales  :  1975  MRN:  [id number]      Admission Summary:   The patient is a 31-year-old gentleman with past medical history of inflammatory bowel disease, history of alcohol abuse, history of tobacco abuse, who presents to the hospital with abdominal pain and weakness.       Interval history / Subjective:   No BM today. Hgb. 6.4 transfuse 1 UPRBC. No acute complaints to include abdominal pain. Assessment & Plan:     Acute blood loss anemia likely related to inflammatory bowel disease   -continue iv protonix iv, solumedrol, abxs and IVF  -Hgb was to 3.4, s/p 3 units PRBC and Hgb was 6.4, and transfuse to one more unit on ,   - repeated Hgb 7.7, monitor H/H  -s/p EGD on  normal esophagus, stomach and duodenum and jejunum  -on GI lite diet  -GI on board  -PPI     Crohn's Colitis with abdominal pain  -on iv solumedrol, mesalamine, metronidazole, levaquin and IVF  -CT abdomen and pelvis thickening and dilatation of the right colon and transverse colon to  the level of the splenic flexure where there is more extensive thickening and a presumed stricture. Immediately inferior to the colon at this level, there is abnormal soft tissue which extends to the jejunal loop, worrisome for a colon enteric fistula.  Nondistended but thickened income and distal terminal ileum.  -no evidence of fistula  -PPD test for possible biologic treatment, no redness or induration so far  -Chest x ray on  normal  -Gi following, Barium enema today, working on [facility] for Humira as OP  -reports difficulty with affording medications, will likely need assistance from CM to help with meds when stable for discharge    Iron deficiency likely related to inflammatory bowel disease  -transfused total of 5 units of PRBC  -patient with thrombocytosis, repeat iron level later      Hypokalemia-resolved  -replenished     Chronic thrombocytosis   -platelet high but improving, 811 from 1115     Tobacco abuse   -educated on cessation    Leukocytosis due to steroid  -no fever    Non complaint  -patient left AMA recently from [facility]    Reported wt loss  -likely d/t the above  -nutrition consulted    Code status: Full Code  DVT prophylaxis: SCD    Care Plan discussed with: Patient/Family and Nurse Dr Farooq Krause  Disposition: Home w/Family hopefully soon      Hospital Problems  Date Reviewed: 9/28/2017          Codes Class Noted POA    Crohn disease ([facility] [address] ICD-10-CM: K50.90  ICD-9-CM: 555.9  9/29/2017 Yes        * (Principal)Acute blood loss anemia ICD-10-CM: D62  ICD-9-CM: 285.1  9/28/2017 Unknown        GI bleed ICD-10-CM: K92.2  ICD-9-CM: 578.9  9/28/2017 Unknown              Vital Signs:    Last 24hrs VS reviewed since prior progress note. Most recent are:  Visit Vitals    /63 (BP 1 Location: Left arm, BP Patient Position: At rest)    Pulse 74    Temp 98.2 °F (36.8 °C)    Resp 16    Ht 6' (1.829 m)    Wt 58.3 kg (128 lb 8.5 oz)    SpO2 100%    BMI 17.43 kg/m2         Intake/Output Summary (Last 24 hours) at 10/02/17 1434  Last data filed at 10/02/17 0910   Gross per 24 hour   Intake          2418.58 ml   Output             2550 ml   Net          -131.42 ml        Physical Examination:             Constitutional:  No acute distress, cooperative, pleasant    ENT:  Oral mucous moist, oropharynx benign. Neck supple,    Resp:  CTA bilaterally. No wheezing/rhonchi/rales. No accessory muscle use   CV:  Regular rhythm, normal rate, no murmurs, gallops, rubs    GI:  Soft, non distended, non tender. normoactive bowel sounds, no hepatosplenomegaly     Musculoskeletal:  No edema, left forearm PPD site no reaction    Neurologic:  Moves all extremities.   AAOx3,      Skin:  Good turgor, no rashes or ulcers Data Review:    Review and/or order of clinical lab test      Labs:     Recent Labs      10/02/17   0404  10/01/17   0318   WBC  16.1*  16.6*   HGB  6.4*  7.4*   HCT  20.9*  23.8*   PLT  811*  892*     Recent Labs      10/02/17   0404  10/01/17   0318  09/30/17   0642   NA  139  136  135*   K  3.9  4.1  4.1   CL  107  107  107   CO2  23  21  19*   BUN  11  13  12   CREA  0.65*  0.78  0.75   GLU  105*  124*  113*   CA  8.4*  8.5  8.9     Recent Labs      10/01/17   0318  09/30/17   0642   SGOT  10*  11*   ALT  9*  10*   AP  67  71   TBILI  0.1*  0.3   TP  6.9  7.4   ALB  2.5*  2.6*   GLOB  4.4*  4.8*     No results for input(s): INR, PTP, APTT in the last 72 hours. No lab exists for component: INREXT, INREXT   No results for input(s): FE, TIBC, PSAT, FERR in the last 72 hours. No results found for: FOL, RBCF   No results for input(s): PH, PCO2, PO2 in the last 72 hours. No results for input(s): CPK, CKNDX, TROIQ in the last 72 hours.     No lab exists for component: CPKMB  No results found for: CHOL, CHOLX, CHLST, CHOLV, HDL, LDL, LDLC, DLDLP, TGLX, TRIGL, TRIGP, CHHD, CHHDX  Lab Results   Component Value Date/Time    Glucose (POC) 114 10/02/2017 11:29 AM    Glucose (POC) 110 10/02/2017 06:55 AM    Glucose (POC) 157 10/01/2017 09:23 PM    Glucose (POC) 127 10/01/2017 04:49 PM    Glucose (POC) 165 10/01/2017 11:27 AM     Lab Results   Component Value Date/Time    Color YELLOW/STRAW 07/18/2017 12:10 AM    Appearance CLEAR 07/18/2017 12:10 AM    Specific gravity 1.030 07/18/2017 12:10 AM    pH (UA) 5.5 07/18/2017 12:10 AM    Protein TRACE 07/18/2017 12:10 AM    Glucose NEGATIVE  07/18/2017 12:10 AM    Ketone TRACE 07/18/2017 12:10 AM    Urobilinogen 1.0 07/18/2017 12:10 AM    Nitrites NEGATIVE  07/18/2017 12:10 AM    Leukocyte Esterase NEGATIVE  07/18/2017 12:10 AM    Epithelial cells FEW 07/18/2017 12:10 AM    Bacteria NEGATIVE  07/18/2017 12:10 AM    WBC 0-4 07/18/2017 12:10 AM    RBC 0-5 07/18/2017 12:10 AM Medications Reviewed:     Current Facility-Administered Medications   Medication Dose Route Frequency    0.9% sodium chloride infusion 250 mL  250 mL IntraVENous PRN    pantoprazole (PROTONIX) tablet 40 mg  40 mg Oral ACB&D    0.9% sodium chloride infusion 250 mL  250 mL IntraVENous PRN    sodium chloride (NS) flush 5-10 mL  5-10 mL IntraVENous Q8H    sodium chloride (NS) flush 5-10 mL  5-10 mL IntraVENous PRN    ondansetron (ZOFRAN) injection 4 mg  4 mg IntraVENous Q4H PRN    methylPREDNISolone (PF) (SOLU-MEDROL) injection 60 mg  60 mg IntraVENous Q8H    levoFLOXacin (LEVAQUIN) 750 mg in D5W IVPB  750 mg IntraVENous Q24H    metroNIDAZOLE (FLAGYL) IVPB premix 500 mg  500 mg IntraVENous Q8H    0.9% sodium chloride infusion  75 mL/hr IntraVENous CONTINUOUS    mesalamine DR (ASACOL HD) tablet 800 mg  800 mg Oral TID WITH MEALS    HYDROmorphone (PF) (DILAUDID) injection 1 mg  1 mg IntraVENous Q4H PRN    0.9% sodium chloride infusion 250 mL  250 mL IntraVENous PRN    diazePAM (VALIUM) injection 5 mg  5 mg IntraVENous Q4H PRN     ______________________________________________________________________  EXPECTED LENGTH OF STAY: - - -  ACTUAL LENGTH OF STAY:          [address] [place] [address],

## 2017-10-02 NOTE — PROGRESS NOTES
Bedside shift change report given to [facility] (oncoming nurse) by Geri Vanegas (offgoing nurse). Report included the following information SBAR, Kardex, MAR and Recent Results.

## 2017-10-03 VITALS
HEIGHT: 72 IN | DIASTOLIC BLOOD PRESSURE: 62 MMHG | OXYGEN SATURATION: 100 % | SYSTOLIC BLOOD PRESSURE: 105 MMHG | WEIGHT: 128.53 LBS | BODY MASS INDEX: 17.41 KG/M2 | RESPIRATION RATE: 18 BRPM | TEMPERATURE: 98.1 F | HEART RATE: 62 BPM

## 2017-10-03 LAB
ABO + RH BLD: NORMAL
BASOPHILS # BLD: 0 K/UL (ref 0–0.1)
BASOPHILS NFR BLD: 0 % (ref 0–1)
BLD PROD TYP BPU: NORMAL
BLOOD GROUP ANTIBODIES SERPL: NORMAL
BPU ID: NORMAL
CROSSMATCH RESULT,%XM: NORMAL
DIFFERENTIAL METHOD BLD: ABNORMAL
EOSINOPHIL # BLD: 0 K/UL (ref 0–0.4)
EOSINOPHIL NFR BLD: 0 % (ref 0–7)
GLUCOSE BLD STRIP.AUTO-MCNC: 113 MG/DL (ref 65–100)
GLUCOSE BLD STRIP.AUTO-MCNC: 114 MG/DL (ref 65–100)
GLUCOSE BLD STRIP.AUTO-MCNC: 118 MG/DL (ref 65–100)
HCT VFR BLD AUTO: 26.4 % (ref 36.6–50.3)
HGB BLD-MCNC: 8.3 G/DL (ref 12.1–17)
LYMPHOCYTES # BLD: 0.6 K/UL (ref 0.8–3.5)
LYMPHOCYTES NFR BLD: 4 % (ref 12–49)
MCH RBC QN AUTO: 21.6 PG (ref 26–34)
MCHC RBC AUTO-ENTMCNC: 31.4 G/DL (ref 30–36.5)
MCV RBC AUTO: 68.6 FL (ref 80–99)
MONOCYTES # BLD: 0.6 K/UL (ref 0–1)
MONOCYTES NFR BLD: 4 % (ref 5–13)
NEUTS SEG # BLD: 14.6 K/UL (ref 1.8–8)
NEUTS SEG NFR BLD: 92 % (ref 32–75)
PLATELET # BLD AUTO: 837 K/UL (ref 150–400)
PLATELET COMMENTS,PCOM: ABNORMAL
RBC # BLD AUTO: 3.85 M/UL (ref 4.1–5.7)
RBC MORPH BLD: ABNORMAL
SERVICE CMNT-IMP: ABNORMAL
SPECIMEN EXP DATE BLD: NORMAL
STATUS OF UNIT,%ST: NORMAL
UNIT DIVISION, %UDIV: 0
WBC # BLD AUTO: 15.8 K/UL (ref 4.1–11.1)

## 2017-10-03 PROCEDURE — 74011250637 HC RX REV CODE- 250/637: Performed by: NURSE PRACTITIONER

## 2017-10-03 PROCEDURE — 74011250637 HC RX REV CODE- 250/637: Performed by: FAMILY MEDICINE

## 2017-10-03 PROCEDURE — 82962 GLUCOSE BLOOD TEST: CPT

## 2017-10-03 PROCEDURE — 85025 COMPLETE CBC W/AUTO DIFF WBC: CPT | Performed by: NURSE PRACTITIONER

## 2017-10-03 PROCEDURE — 36415 COLL VENOUS BLD VENIPUNCTURE: CPT | Performed by: NURSE PRACTITIONER

## 2017-10-03 PROCEDURE — 74011000250 HC RX REV CODE- 250: Performed by: FAMILY MEDICINE

## 2017-10-03 PROCEDURE — 74011250636 HC RX REV CODE- 250/636: Performed by: FAMILY MEDICINE

## 2017-10-03 RX ORDER — PREDNISONE 10 MG/1
TABLET ORAL
Qty: 147 TAB | Refills: 0 | Status: SHIPPED | OUTPATIENT
Start: 2017-10-03 | End: 2018-01-26

## 2017-10-03 RX ORDER — PREDNISONE 20 MG/1
60 TABLET ORAL
Status: DISCONTINUED | OUTPATIENT
Start: 2017-10-04 | End: 2017-10-03 | Stop reason: HOSPADM

## 2017-10-03 RX ORDER — PREDNISONE 10 MG/1
TABLET ORAL
Qty: 147 TAB | Refills: 0 | Status: SHIPPED | OUTPATIENT
Start: 2017-10-03 | End: 2017-10-03

## 2017-10-03 RX ADMIN — MESALAMINE 800 MG: 800 TABLET, DELAYED RELEASE ORAL at 12:13

## 2017-10-03 RX ADMIN — MESALAMINE 800 MG: 800 TABLET, DELAYED RELEASE ORAL at 08:44

## 2017-10-03 RX ADMIN — MESALAMINE 800 MG: 800 TABLET, DELAYED RELEASE ORAL at 16:27

## 2017-10-03 RX ADMIN — METRONIDAZOLE 500 MG: 500 INJECTION, SOLUTION INTRAVENOUS at 06:29

## 2017-10-03 RX ADMIN — PANTOPRAZOLE SODIUM 40 MG: 40 TABLET, DELAYED RELEASE ORAL at 06:30

## 2017-10-03 RX ADMIN — Medication 10 ML: at 12:22

## 2017-10-03 RX ADMIN — Medication 10 ML: at 06:29

## 2017-10-03 RX ADMIN — PANTOPRAZOLE SODIUM 40 MG: 40 TABLET, DELAYED RELEASE ORAL at 16:27

## 2017-10-03 RX ADMIN — METHYLPREDNISOLONE SODIUM SUCCINATE 60 MG: 125 INJECTION, POWDER, FOR SOLUTION INTRAMUSCULAR; INTRAVENOUS at 06:29

## 2017-10-03 NOTE — PROGRESS NOTES
Spiritual Care partner visit. [facility] Staff  (Ale Harris Patient Care Specialist)   Paging Service [phone])

## 2017-10-03 NOTE — DISCHARGE INSTRUCTIONS
Discharge Instructions       PATIENT ID: Edu Leos  MRN: [id number]   YOB: 1975    DATE OF ADMISSION: 9/28/2017  2:20 PM    DATE OF DISCHARGE: 10/3/2017    PRIMARY CARE PROVIDER: None     ATTENDING PHYSICIAN: Kimberlee Diop MD  DISCHARGING PROVIDER: Tracy Goode NP    To contact this individual call [phone] and ask the  to page. If unavailable ask to be transferred the Adult Hospitalist Department. DISCHARGE DIAGNOSES Anemia, Crohn's exacerbation    CONSULTATIONS: IP CONSULT TO HOSPITALIST  IP CONSULT TO GASTROENTEROLOGY  IP CONSULT TO GENERAL SURGERY    PROCEDURES/SURGERIES: Procedure(s):  ESOPHAGOGASTRODUODENOSCOPY (EGD)    PENDING TEST RESULTS:   At the time of discharge the following test results are still pending: none    FOLLOW UP APPOINTMENTS:   Follow-up Information     Follow up With Details Comments Contact Info    None   None (395) Patient stated that they have no PCP      Chere Dance, MD  Follow up as scheduled on 10/11.  [place] [id number] [phone]             ADDITIONAL CARE RECOMMENDATIONS:   -Take steroid taper as directed. -Start taking iron supplement twice daily for your anemia. Make sure to take with food to prevent stomach upset. -Recheck your labs (hemoglobin) either with pcp or Dr Rossy Ortega within 1-2 weeks  -Make sure to follow up with Dr Rossy Ortega as scheduled on 10/11/2017. DIET: Crohns diet    ACTIVITY: as tolerated    WOUND CARE: none    EQUIPMENT needed: none      DISCHARGE MEDICATIONS:   See Medication Reconciliation Form    · It is important that you take the medication exactly as they are prescribed. · Keep your medication in the bottles provided by the pharmacist and keep a list of the medication names, dosages, and times to be taken in your wallet. · Do not take other medications without consulting your doctor. NOTIFY YOUR PHYSICIAN FOR ANY OF THE FOLLOWING:   Fever over 101 degrees for 24 hours. Chest pain, shortness of breath, fever, chills, nausea, vomiting, diarrhea, change in mentation, falling, weakness, bleeding. Severe pain or pain not relieved by medications. Or, any other signs or symptoms that you may have questions about. DISPOSITION:   X Home With:   OT  PT  HH  RN       SNF/Inpatient Rehab/LTAC    Independent/assisted living    Hospice    Other:     CDMP Checked: Yes X     PROBLEM LIST Updated:   Yes X       Signed:   Sherren Craven, NP  10/3/2017  12:49 PM

## 2017-10-03 NOTE — PROGRESS NOTES
Bedside shift change report given to Suzan Hinojosa (oncoming nurse) by Jac Morrow (offgoing nurse). Report included the following information SBAR, Kardex, MAR and Recent Results.

## 2017-10-03 NOTE — PROGRESS NOTES
[address]  217 [facility] [address]   [phone]                GI PROGRESS NOTE  Niraj Guan, [facility]  Work Cell: [phone]      NAME:   Halima Soria   :    1975   MRN:    [id number]     Assessment/Plan   1. Crohn's colitis - with stricturing and fistula, worsening disease likely related to poor medication compliance. CTE yesterday demonstrated long segment of narrowing of distal transverse colon with intramural/subserosal fistula and severe inflammation of TI with massive wall edema. Symptoms improved. Chest x-ray and PPD negative. - GI lite diet  - Continue mesalamine and may switch to PO steroids   - Stop antibiotics  - Will initiate approval process to start Humira as outpatient  2. Severe anemia - likely multifactorial, s/p EGD which was negative. Hgb improved after transfusion. Reports some minimal rectal bleeding.   - Recommend starting iron supplements  - Repeat CBC pending  - Monitor H&H and transfuse as needed  3. Weight loss - secondary to malnutrition from the above     If Hgb improved, okay to discharge from GI standpoint to follow-up outpatient in 2-4 weeks. Patient Active Problem List   Diagnosis Code    Enterocolitis K52.9    Acute blood loss anemia D62    GI bleed K92.2    [facility] K50.90       Subjective:     Feeling well. Denies any abdominal pain. Tolerating diet without nausea or vomiting. Passing flatus and having regular bowel movements. States there is small amount of blood with stools. No fever or chills.        Review of Systems    Constitutional: negative fever, negative chills, negative weight loss  Eyes:   negative visual changes  ENT:   negative sore throat, tongue or lip swelling  Respiratory:  negative cough, negative dyspnea  Cards:  negative for chest pain, palpitations, lower extremity edema  GI:   See HPI  :  negative for frequency, dysuria  Integument:  negative for rash and pruritus  Heme:  negative for easy bruising and gum/nose bleeding  Musculoskel: negative for myalgias, back pain and muscle weakness  Neuro: negative for headaches, dizziness, vertigo  Psych:  negative for feelings of anxiety, depression     Objective:     VITALS:   Last 24hrs VS reviewed since prior hospitalist progress note. Most recent are:  Visit Vitals    /71    Pulse 66    Temp 98.3 °F (36.8 °C)    Resp 18    Ht 6' (1.829 m)    Wt 58.3 kg (128 lb 8.5 oz)    SpO2 100%    BMI 17.43 kg/m2       Intake/Output Summary (Last 24 hours) at 10/03/17 1028  Last data filed at 10/03/17 3721   Gross per 24 hour   Intake           1696.3 ml   Output             2850 ml   Net          -1153.7 ml        PHYSICAL EXAM:  General   well developed, thin, alert, in no acute distress  EENT  Normocephalic, Atraumatic, PERRLA, EOMI, sclera clear  Respiratory   Clear To Auscultation bilaterally - no wheezes, rales, rhonchi, or crackles  Cardiology  Regular Rate and Rythmn  - no murmurs, rubs or gallops  Abdominal  Soft, non-tender, non-distended, positive bowel sounds, no hepatosplenomegaly, no palpable mass  Extremities  No clubbing, cyanosis, or edema. Pulses intact. Neurological  No focal neurological deficits noted  Psychological  Oriented x 3. Normal affect.        Lab Data   Recent Results (from the past 12 hour(s))   GLUCOSE, POC    Collection Time: 10/03/17  7:33 AM   Result Value Ref Range    Glucose (POC) 118 (H) 65 - 100 mg/dL    Performed by Hugo Herman          Medications: Reviewed    PMH/SH reviewed - no change compared to H&P  Mid-Level Provider: Ilene Chester NP   Date/Time:  10/3/2017

## 2017-10-03 NOTE — PROGRESS NOTES
2:38 PM  CM notified that patient has orders for discharge. CM received a consult for assisting patient with PCP. CM contacted CM Specialist, Prem Robert [phone] to request assistance with PCP. No one was present at the time of the call. CM left a message requesting a return call. CM met with patient and reviewed and discussed disposition needs.   Patient requested that a PCP be set up at [facility]  located on [address] [place] [address],  [address] [place] [address] [place] [address] [phone]  Patient will be seen October 11, 2017 by Mechelle Epperson NP at 2:40 pm.  CM placed follow-up on AVS.    ASHLY Segovia/CATHY

## 2017-10-03 NOTE — DISCHARGE SUMMARY
Discharge Summary       PATIENT ID: Halima Soria  MRN: [id number]   YOB: 1975    DATE OF ADMISSION: 9/28/2017  2:20 PM    DATE OF DISCHARGE: 10/3/2017   PRIMARY CARE PROVIDER: Lou Carlisle NP     ATTENDING PHYSICIAN: Misty Posada MD  DISCHARGING PROVIDER: Sherren Broker, NP    To contact this individual call [phone] and ask the  to page. If unavailable ask to be transferred the Adult Hospitalist Department. CONSULTATIONS: IP CONSULT TO HOSPITALIST  IP CONSULT TO GASTROENTEROLOGY  IP CONSULT TO GENERAL SURGERY    PROCEDURES/SURGERIES: Procedure(s):  ESOPHAGOGASTRODUODENOSCOPY (EGD)    ADMITTING DIAGNOSES & HOSPITAL COURSE:   Dx: Acute blood loss anemia, Crohn's colitis    The patient is a 81-year-old gentleman with past medical history of inflammatory bowel disease, history of alcohol abuse, history of tobacco abuse, who presents to the hospital with abdominal pain and weakness.      Hgb 3.4 on admission. Transfused 5 UPRBCs total during hospitalization. Hgb now stable at 8.3.     9/29 EGD with Dr Evaristo Cruz showing normal esophagus, stomach and duodenum/jejunum. Patient tolerating diet. Patient stable for discharge home. Discussed importance of medication and diet compliance and outpatient follow up with patient. DISCHARGE DIAGNOSES / PLAN:      Acute blood loss anemia likely related to inflammatory bowel disease   -s/p 5 UPRBCs  - hgb stable  -s/p EGD on 9/29 normal esophagus, stomach and duodenum and jejunum  -on GI lite diet  -f/u with GI OP      Crohn's Colitis with abdominal pain  -given iv solumedrol, mesalamine, metronidazole, levaquin and IVF, spoke with Gi recommending long steroid taper, and mesalamine, no need for abx  -CT abdomen and pelvis thickening and dilatation of the right colon and transverse colon to the level of the splenic flexure where there is more extensive thickening and a presumed stricture.  Immediately inferior to the colon at this level, there is abnormal soft tissue which extends to the jejunal loop, worrisome for a colon enteric fistula. Nondistended but thickened income and distal terminal ileum.  -no evidence of fistula  -PPD test for possible biologic treatment, no redness or induration so far  -Chest x ray on 9/29 normal  -Gi following, f/u OP, working on auth for Humira as OP  -reports difficulty with affording medications in the past, review prednisone with pt on good rx which showed cost would be ~$26, pt reports he can afford that. Pt already has about 70 tabs of Mesalamine at home. Advise if any difficulty with affording future meds to f/u with GI office     Iron deficiency likely related to inflammatory bowel disease  -transfused total of 5 units of PRBC  -started on iron supplement      Hypokalemia-resolved  -replenished      Chronic thrombocytosis   -improving      Tobacco abuse   -educated on cessation     Leukocytosis due to steroid  -no fever     Non complaint  -patient left AMA recently from [address]     Reported wt loss  -likely d/t the above  -nutrition consulted       PENDING TEST RESULTS:   At the time of discharge the following test results are still pending: none    FOLLOW UP APPOINTMENTS:    Follow-up Information     Follow up With Details Comments Contact Info    None   None (395) Patient stated that they have no PCP      Brianne Porter MD  Follow up as scheduled on 10/11.  [address] on 10/11/2017 Primary Care Physician:  Elena Felton NP at 2:40 pm 200 [facility],  [address] [address],  [address] [place] [address] Road  [phone]           ADDITIONAL CARE RECOMMENDATIONS:   -Take steroid taper as directed. -Start taking iron supplement twice daily for your anemia. Make sure to take with food to prevent stomach upset.    -Recheck your labs (hemoglobin) either with pcp or Dr Lasha Caraballo within 1-2 weeks  -Make sure to follow up with Dr Alma Rosa Gaviria as scheduled on 10/11/2017. DIET: Crohns diet    ACTIVITY: as tolerated    WOUND CARE: none    EQUIPMENT needed: none      DISCHARGE MEDICATIONS:  Current Discharge Medication List      START taking these medications    Details   ferrous sulfate (SLOW RELEASE IRON) 142 mg (45 mg iron) ER tablet Take 1 Tab by mouth two (2) times a day. Take with meals  Indications: IRON DEFICIENCY ANEMIA  Qty: 60 Tab, Refills: 0      predniSONE (DELTASONE) 10 mg tablet 60 mg daily (6 tabs) x 7 days then 50mg(5 tabs) daily  x7 days then 40mg (4 tabs)daily x 7 days then 30mg (3 tabs) daily x 7 days then 20mg (2 tabs) daily x7 days then 10mg (1 tab) daily x7 days  Qty: 147 Tab, Refills: 0         CONTINUE these medications which have NOT CHANGED    Details   CYANOCOBALAMIN, VITAMIN B-12, (VITAMIN B-12 PO) Take  by mouth daily. cholecalciferol, vitamin D3, 4,000 unit cap Take 4,000 Units by mouth daily. mesalamine DR (ASACOL HD) 800 mg DR tablet Take 800 mg by mouth three (3) times daily. Indications: CROHN'S DISEASE      oxyCODONE IR (ROXICODONE) 10 mg tab immediate release tablet Take 10 mg by mouth every eight (8) hours as needed. fish oil-omega-3 fatty acids (FISH OIL) 340-1,000 mg capsule Take 1 Cap by mouth daily. STOP taking these medications       mesalamine (PENTASA) 500 mg CR capsule Comments:   Reason for Stopping:                 NOTIFY YOUR PHYSICIAN FOR ANY OF THE FOLLOWING:   Fever over 101 degrees for 24 hours. Chest pain, shortness of breath, fever, chills, nausea, vomiting, diarrhea, change in mentation, falling, weakness, bleeding. Severe pain or pain not relieved by medications. Or, any other signs or symptoms that you may have questions about.     DISPOSITION:  X  Home With:   OT  PT  HH  RN       Long term SNF/Inpatient Rehab    Independent/assisted living    Hospice    Other:       PATIENT CONDITION AT DISCHARGE:     Functional status    Poor     Deconditioned    X Independent      Cognition    X Lucid     Forgetful     Dementia      Catheters/lines (plus indication)    Agee     PICC     PEG    X None      Code status   X  Full code     DNR      PHYSICAL EXAMINATION AT DISCHARGE:  Constitutional:  No acute distress, cooperative, pleasant    ENT:  Oral mucous moist, oropharynx benign. Neck supple,    Resp:  CTA bilaterally. No wheezing/rhonchi/rales. No accessory muscle use   CV:  Regular rhythm, normal rate, no murmurs, gallops, rubs    GI:  Soft, non distended, non tender. normoactive bowel sounds, no hepatosplenomegaly     Musculoskeletal:  No edema, left forearm PPD site no reaction    Neurologic:  Moves all extremities.   AAOx3,                                              Skin:  Good turgor, no rashes or ulcers        CHRONIC MEDICAL DIAGNOSES:  Problem List as of 10/3/2017  Date Reviewed: 9/28/2017          Codes Class Noted - Resolved    [facility] ICD-10-CM: K50.90  ICD-9-CM: 555.9  9/29/2017 - Present        * (Principal)Acute blood loss anemia ICD-10-CM: D62  ICD-9-CM: 285.1  9/28/2017 - Present        GI bleed ICD-10-CM: K92.2  ICD-9-CM: 578.9  9/28/2017 - Present        Enterocolitis ICD-10-CM: K52.9  ICD-9-CM: 558.9  4/29/2017 - Present              Greater than 30 minutes were spent with the patient on counseling and coordination of care    Signed:   Abbi Heaton NP  10/3/2017  12:44 PM

## 2017-10-03 NOTE — ADT AUTH CERT NOTES
Utilization Review           Anemia, Iron Deficiency or Unspecified - Care Day 5 (10/2/2017) by Augusto Huynh RN        Review Status Review Entered       Completed 10/2/2017       Details              Care Day: 5 Care Date: 10/2/2017 Level of Care: Inpatient Floor       Guideline Day 2        Clinical Status       (X) * Hemodynamic stability       10/2/2017 3:07 PM EDT by Leatha Bonner         98.3, 103/63, HR 72, RR 18, O2 sat 98, RA              (X) * Mental status at baseline       ( ) * Active blood loss absent       ( ) * Signs and symptoms of anemia absent or improved       ( ) * Hgb/Hct level stable and acceptable for next level of care       ( ) * Etiology of anemia requiring inpatient care absent       ( ) * Discharge plans and education understood              Activity       (X) * Ambulatory [H]              Routes       ( ) * Oral hydration, medications, and diet       10/2/2017 3:07 PM EDT by Leatha Bonner         Meds: NS 75 ml.hr, Levaquin iv, asacol po, solu-medrol iv q8h, flagyl iv q8h, protonix po,                     Interventions       (X) Hgb/Hct       10/2/2017 3:07 PM EDT by Leatha Bonner         Labs: WBC 16.1, RBC 3.13, HGb 6.4, HCT 20.9, MCV 66.8, MCH 20.4, Platelet 840, Glucose 105, Creat 0.65, calcium 8.4,                                          * Milestone              Additional Notes       GI       1. Crohn's colitis - with stricturing and suspected fistula, worsening disease likely related to poor medication compliance. Repeat CT without definite evidence of fistula. Symptoms improved. Chest x-ray and PPD negative.        - GI lite diet       - Continue IV steroids, antibiotics and mesalamine        - Barium enema today       - Will initiate approval process to start Humira as outpatient       2. Severe anemia - likely multifactorial, s/p EGD which was negative. Had recurrent drop in H&H.  Reports some minimal rectal bleeding.        - Monitor H&H and follow clinical course for any overt signs of bleeding       - Transfuse as needed       3. Weight loss - secondary to malnutrition from the above                     Hosptialist:       No BM today. Hgb. 6.4 transfuse 1 UPRBC.  No acute complaints to include abdominal pain       Gi following, Barium enema today, working on auth for Humira as OP       -reports difficulty with affording medications, will likely need assistance from CM to help with meds when stable for discharge           Anemia, Iron Deficiency or Unspecified - Care Day 4 (10/1/2017) by Debbie Mayer RN        Review Status Review Entered       Completed 10/2/2017       Details              Care Day: 4 Care Date: 10/1/2017 Level of Care: Inpatient Floor       Guideline Day 2        Clinical Status       (X) * Hemodynamic stability       10/2/2017 10:56 AM EDT by Marcie Krishnamurthy         T 98.2 P 60 RR 16 /66 spO2 100%              (X) * Mental status at baseline       10/2/2017 10:56 AM EDT by Marcie Krishnamurthy         baseline              ( ) * Active blood loss absent       ( ) * Signs and symptoms of anemia absent or improved       ( ) * Hgb/Hct level stable and acceptable for next level of care       ( ) * Etiology of anemia requiring inpatient care absent       ( ) * Discharge plans and education understood              Activity       (X) * Ambulatory [H]       10/2/2017 10:56 AM EDT by Marcie Krishnamurthy         ambulate with assistance                     Routes       ( ) * Oral hydration, medications, and diet                                   * Milestone              Additional Notes       Bowel movement was 9/30       Acute blood loss anemia likely related to inflammatory bowel disease        -continue iv protonix iv, solumedrol, abxs and IVF       -Hgb was to 3.4, s/p 3 units PRBC and Hgb was 6.4, and transfuse to one more unit on 9/289,        - repeated Hgb 7.7, monitor H/H       -s/p EGD on 9/29 normal esophagus, stomach and duodenum and jejunum       -on GI lite diet       -GI on board                Abdominal pain likely due to acute crohn's flare up       -on iv solumedrol, mesalamine, metronidazole, levaquin and IVF       -CT abdomen and pelvis thickening and dilatation of the right colon and transverse colon to       the level of the splenic flexure where there is more extensive thickening and a presumed stricture. Immediately inferior to the colon at this level, there is abnormal soft tissue which extends to the jejunal loop, worrisome for a colon enteric fistula.  Nondistended but thickened income and distal terminal ileum.       -no evidence of fistula       -PPD test for possible biologic treatment, no redness or induration so far       -Chest x ray on 9/29 normal               Iron deficiency likely related to inflammatory bowel disease       -transfused total of 4 units of PRBC       -patient with thrombocytosis, repeat iron level later                 Hypokalemia        -replaced and resolved                Chronic thrombocytosis        -platelet high but improving, 892 from 1115                Tobacco abuse        -advised to stop alcohol               Leukocytosis due to steroid       -no fever               Non complaint       -patient left AMA recently from [facility]               Code status: Full Code       DVT prophylaxis: SCD               Care Plan discussed with: Patient/Family, Nurse and        Disposition: Home w/Family       T 98.2 P 60 RR 16 /66 spO2 100%       WBC 16.6, H/H 7.4/23.8, Platelet 520, Glucose 124, SGOT 10, ALT 9, Total bili 0.1, Albumin 2.5       [email or web address] IV, Dilaudid IV, Levaquin IV qday, Flagyl IV q8h, Solumedrol IV q8h, Protonix IV daily       GI lite diet, CIWA monitoring, Orthostatic BPs, SCDs, ambulate with assistance           Anemia, Iron Deficiency or Unspecified - Care Day 3 (9/30/2017) by Mac Lambert RN        Review Status Review Entered       Completed 10/2/2017       Details            Care Day: 3 Care Date: 9/30/2017 Level of Care: ICU       Guideline Day 2        Clinical Status       (X) * Hemodynamic stability       10/2/2017 10:54 AM EDT by Lyndon Blount         T 98.7 P 74 RR 18 /83 spO2 100%              (X) * Mental status at baseline       10/2/2017 10:54 AM EDT by Lyndon Blount         baseline              ( ) * Active blood loss absent       ( ) * Signs and symptoms of anemia absent or improved       ( ) * Hgb/Hct level stable and acceptable for next level of care       ( ) * Etiology of anemia requiring inpatient care absent       ( ) * Discharge plans and education understood              Activity       (X) * Ambulatory [H]       10/2/2017 10:54 AM EDT by Lyndon Blount         ambulate with assistance                     Routes       ( ) * Oral hydration, medications, and diet                                   * Milestone              Additional Notes       Had bowel this morning dark brown       Acute blood loss anemia likely related to inflammatory bowel disease        -on iv protonix iv, solumedrol, abxs and IVF       -Hgb was to 3.4, s/p 3 units PRBC and Hgb was 6.4, and transfuse to one more unit on 9/289, and repeated Hgb 7.9, monitor H/H       -s/p EGD on 9/29 normal esophagus, stomach and duodenum and jejunum       -on GI lite diet       -GI on board                Abdominal pain likely due to acute crohn's flare up       -on iv solumedrol, mesalamine, metronidazole, levaquin and IVF       -CT abdomen and pelvis thickening and dilatation of the right colon and transverse colon to       the level of the splenic flexure where there is more extensive thickening and a presumed stricture. Immediately inferior to the colon at this level, there is abnormal soft tissue which extends to the jejunal loop, worrisome for a colon enteric fistula.  Nondistended but thickened income and distal terminal ileum.       -no evidence of fistula       -PPD test for possible biologic treatment, no redness or induration so far       -Chest x ray on 9/29 normal               Iron deficiency likely related to inflammatory bowel disease       -transfused total of 4 units of PRBC       -patient with thrombocytosis, repeat iron level later                 Hypokalemia        -replaced and resolved                Chronic thrombocytosis        -platelet high but stable, 997 from 1115                Tobacco abuse        -advised to stop alcohol               Non complaint       -patient left AMA recently from [place] [facility]               Code status: Full Code       DVT prophylaxis: SCD               Care Plan discussed with: Patient/Family, Nurse and        Disposition: Home w/Family       T 98.7 P 74 RR 18 /83 spO2 100%       H/H 7.9/25.6, Platelet 381, Sodium 135, CO2 19, Glucose 113, SGOT 11, ALT 10, Albumin 2.6       [email or web address] IV, Dilaudid IV, Levaquin IV qday, Flagyl IV q8h, Solumedrol IV q8h, Protonix IV daily       GI lite diet, CIWA monitoring, Orthostatic BPs, SCDs, ambulate with assistance           Anemia, Iron Deficiency or Unspecified - Care Day 2 (9/29/2017) by Angie Terrazas RN        Review Status Review Entered       Completed 9/29/2017       Details              Care Day: 2 Care Date: 9/29/2017 Level of Care: ICU       Guideline Day 2        Clinical Status       (X) * Hemodynamic stability       9/29/2017 2:47 PM EDT by Romie Schilder         T 97.6 P 54 RR 16 /75 spO2 100%              (X) * Mental status at baseline       9/29/2017 2:47 PM EDT by Romie Schilder         baseline              ( ) * Active blood loss absent       ( ) * Signs and symptoms of anemia absent or improved       ( ) * Hgb/Hct level stable and acceptable for next level of care       9/29/2017 2:47 PM EDT by Romie Schilder         H/H 6.4/21.7, Platelet 221, Potassium 3.3, Chloride 115, CO2 19, Creatinine 0.56, Calcium 7.0              ( ) * Etiology of anemia requiring inpatient care absent       ( ) * Discharge plans and education understood              Activity       (X) * Ambulatory [H]       9/29/2017 2:47 PM EDT by Lyndon Blount         ambulate with assistance                     Routes       ( ) * Oral hydration, medications, and diet       9/29/2017 2:47 PM EDT by Lyndon Blount         NPO                                          * Milestone              Additional Notes       Severe ANUP       IBD with GI blood loss       On abx       On steoids       On protonix       On anti-inflammatories       Supportive transfusion as inidicated       Consider iron replacement       Endoscopy planned       T 97.6 P 54 RR 16 /75 spO2 100%       H/H 6.4/21.7, Platelet 954, Potassium 3.3, Chloride 115, CO2 19, Creatinine 0.56, Calcium 7.0       [email or web address]., Dilaudid IV, Levaquin IV qday, Solumedrol IV q8h, Protonix IV daily       NPO, CIWA monitoring, SCDs, orthostatic BPs, ambulate with assistance

## 2018-01-25 ENCOUNTER — HOSPITAL ENCOUNTER (OUTPATIENT)
Age: 43
Setting detail: OBSERVATION
Discharge: HOME | End: 2018-01-26
Attending: EMERGENCY MEDICINE | Admitting: INTERNAL MEDICINE
Payer: MEDICAID

## 2018-01-25 ENCOUNTER — APPOINTMENT (OUTPATIENT)
Dept: CT IMAGING | Age: 43
End: 2018-01-25
Attending: EMERGENCY MEDICINE
Payer: MEDICAID

## 2018-01-25 DIAGNOSIS — K50.10 CROHN'S DISEASE OF COLON WITHOUT COMPLICATION (HCC): ICD-10-CM

## 2018-01-25 DIAGNOSIS — R10.84 ABDOMINAL PAIN, GENERALIZED: Primary | ICD-10-CM

## 2018-01-25 LAB
ALBUMIN SERPL-MCNC: 3.6 G/DL (ref 3.5–5)
ALBUMIN/GLOB SERPL: 0.9 {RATIO} (ref 1.1–2.2)
ALP SERPL-CCNC: 71 U/L (ref 45–117)
ALT SERPL-CCNC: 16 U/L (ref 12–78)
ANION GAP SERPL CALC-SCNC: 7 MMOL/L (ref 5–15)
APPEARANCE UR: CLEAR
AST SERPL-CCNC: 14 U/L (ref 15–37)
BACTERIA URNS QL MICRO: NEGATIVE /HPF
BASOPHILS # BLD: 0 K/UL (ref 0–0.1)
BASOPHILS NFR BLD: 0 % (ref 0–1)
BILIRUB SERPL-MCNC: 0.2 MG/DL (ref 0.2–1)
BILIRUB UR QL: NEGATIVE
BUN SERPL-MCNC: 11 MG/DL (ref 6–20)
BUN/CREAT SERPL: 13 (ref 12–20)
CALCIUM SERPL-MCNC: 8.7 MG/DL (ref 8.5–10.1)
CHLORIDE SERPL-SCNC: 108 MMOL/L (ref 97–108)
CO2 SERPL-SCNC: 27 MMOL/L (ref 21–32)
COLOR UR: NORMAL
CREAT SERPL-MCNC: 0.85 MG/DL (ref 0.7–1.3)
CRP SERPL-MCNC: <0.29 MG/DL (ref 0–0.6)
DIFFERENTIAL METHOD BLD: ABNORMAL
EOSINOPHIL # BLD: 0.1 K/UL (ref 0–0.4)
EOSINOPHIL NFR BLD: 2 % (ref 0–7)
EPITH CASTS URNS QL MICRO: NORMAL /LPF
ERYTHROCYTE [DISTWIDTH] IN BLOOD BY AUTOMATED COUNT: 17.4 % (ref 11.5–14.5)
ERYTHROCYTE [SEDIMENTATION RATE] IN BLOOD: 19 MM/HR (ref 0–15)
GLOBULIN SER CALC-MCNC: 3.9 G/DL (ref 2–4)
GLUCOSE SERPL-MCNC: 84 MG/DL (ref 65–100)
GLUCOSE UR STRIP.AUTO-MCNC: NEGATIVE MG/DL
HCT VFR BLD AUTO: 29.6 % (ref 36.6–50.3)
HGB BLD-MCNC: 8.9 G/DL (ref 12.1–17)
HGB UR QL STRIP: NEGATIVE
HYALINE CASTS URNS QL MICRO: NORMAL /LPF (ref 0–5)
IMM GRANULOCYTES # BLD: 0 K/UL (ref 0–0.04)
IMM GRANULOCYTES NFR BLD AUTO: 0 % (ref 0–0.5)
KETONES UR QL STRIP.AUTO: NEGATIVE MG/DL
LEUKOCYTE ESTERASE UR QL STRIP.AUTO: NEGATIVE
LIPASE SERPL-CCNC: 129 U/L (ref 73–393)
LYMPHOCYTES # BLD: 2.4 K/UL (ref 0.8–3.5)
LYMPHOCYTES NFR BLD: 39 % (ref 12–49)
MCH RBC QN AUTO: 21.5 PG (ref 26–34)
MCHC RBC AUTO-ENTMCNC: 30.1 G/DL (ref 30–36.5)
MCV RBC AUTO: 71.5 FL (ref 80–99)
MONOCYTES # BLD: 0.6 K/UL (ref 0–1)
MONOCYTES NFR BLD: 10 % (ref 5–13)
NEUTS SEG # BLD: 2.9 K/UL (ref 1.8–8)
NEUTS SEG NFR BLD: 49 % (ref 32–75)
NITRITE UR QL STRIP.AUTO: NEGATIVE
NRBC # BLD: 0 K/UL (ref 0–0.01)
NRBC BLD-RTO: 0 PER 100 WBC
PH UR STRIP: 5.5 [PH] (ref 5–8)
PLATELET # BLD AUTO: 554 K/UL (ref 150–400)
PMV BLD AUTO: 9.6 FL (ref 8.9–12.9)
POTASSIUM SERPL-SCNC: 3.9 MMOL/L (ref 3.5–5.1)
PROT SERPL-MCNC: 7.5 G/DL (ref 6.4–8.2)
PROT UR STRIP-MCNC: NEGATIVE MG/DL
RBC # BLD AUTO: 4.14 M/UL (ref 4.1–5.7)
RBC #/AREA URNS HPF: NORMAL /HPF (ref 0–5)
SODIUM SERPL-SCNC: 142 MMOL/L (ref 136–145)
SP GR UR REFRACTOMETRY: 1.02 (ref 1–1.03)
UR CULT HOLD, URHOLD: NORMAL
UROBILINOGEN UR QL STRIP.AUTO: 0.2 EU/DL (ref 0.2–1)
WBC # BLD AUTO: 6.1 K/UL (ref 4.1–11.1)
WBC URNS QL MICRO: NORMAL /HPF (ref 0–4)

## 2018-01-25 PROCEDURE — 81001 URINALYSIS AUTO W/SCOPE: CPT | Performed by: EMERGENCY MEDICINE

## 2018-01-25 PROCEDURE — 96374 THER/PROPH/DIAG INJ IV PUSH: CPT

## 2018-01-25 PROCEDURE — 99285 EMERGENCY DEPT VISIT HI MDM: CPT

## 2018-01-25 PROCEDURE — 85025 COMPLETE CBC W/AUTO DIFF WBC: CPT | Performed by: EMERGENCY MEDICINE

## 2018-01-25 PROCEDURE — 36415 COLL VENOUS BLD VENIPUNCTURE: CPT | Performed by: EMERGENCY MEDICINE

## 2018-01-25 PROCEDURE — 74011250636 HC RX REV CODE- 250/636: Performed by: EMERGENCY MEDICINE

## 2018-01-25 PROCEDURE — 86140 C-REACTIVE PROTEIN: CPT | Performed by: EMERGENCY MEDICINE

## 2018-01-25 PROCEDURE — 96375 TX/PRO/DX INJ NEW DRUG ADDON: CPT

## 2018-01-25 PROCEDURE — 80053 COMPREHEN METABOLIC PANEL: CPT | Performed by: EMERGENCY MEDICINE

## 2018-01-25 PROCEDURE — 74011636320 HC RX REV CODE- 636/320: Performed by: EMERGENCY MEDICINE

## 2018-01-25 PROCEDURE — 74177 CT ABD & PELVIS W/CONTRAST: CPT

## 2018-01-25 PROCEDURE — 96376 TX/PRO/DX INJ SAME DRUG ADON: CPT

## 2018-01-25 PROCEDURE — 85652 RBC SED RATE AUTOMATED: CPT | Performed by: EMERGENCY MEDICINE

## 2018-01-25 PROCEDURE — 94761 N-INVAS EAR/PLS OXIMETRY MLT: CPT

## 2018-01-25 PROCEDURE — 83690 ASSAY OF LIPASE: CPT | Performed by: EMERGENCY MEDICINE

## 2018-01-25 PROCEDURE — 74011250637 HC RX REV CODE- 250/637: Performed by: EMERGENCY MEDICINE

## 2018-01-25 PROCEDURE — 74011000258 HC RX REV CODE- 258: Performed by: EMERGENCY MEDICINE

## 2018-01-25 PROCEDURE — 96361 HYDRATE IV INFUSION ADD-ON: CPT

## 2018-01-25 RX ORDER — SODIUM CHLORIDE 0.9 % (FLUSH) 0.9 %
10 SYRINGE (ML) INJECTION
Status: COMPLETED | OUTPATIENT
Start: 2018-01-25 | End: 2018-01-25

## 2018-01-25 RX ORDER — DICYCLOMINE HYDROCHLORIDE 10 MG/1
20 CAPSULE ORAL
Status: COMPLETED | OUTPATIENT
Start: 2018-01-25 | End: 2018-01-25

## 2018-01-25 RX ORDER — HYDROMORPHONE HYDROCHLORIDE 1 MG/ML
0.5 INJECTION, SOLUTION INTRAMUSCULAR; INTRAVENOUS; SUBCUTANEOUS
Status: COMPLETED | OUTPATIENT
Start: 2018-01-25 | End: 2018-01-25

## 2018-01-25 RX ORDER — ONDANSETRON 2 MG/ML
4 INJECTION INTRAMUSCULAR; INTRAVENOUS
Status: COMPLETED | OUTPATIENT
Start: 2018-01-25 | End: 2018-01-25

## 2018-01-25 RX ORDER — DICYCLOMINE HYDROCHLORIDE 10 MG/ML
20 INJECTION INTRAMUSCULAR
Status: DISCONTINUED | OUTPATIENT
Start: 2018-01-25 | End: 2018-01-25 | Stop reason: DRUGHIGH

## 2018-01-25 RX ORDER — HYDROMORPHONE HYDROCHLORIDE 1 MG/ML
1 INJECTION, SOLUTION INTRAMUSCULAR; INTRAVENOUS; SUBCUTANEOUS
Status: COMPLETED | OUTPATIENT
Start: 2018-01-25 | End: 2018-01-25

## 2018-01-25 RX ADMIN — HYDROMORPHONE HYDROCHLORIDE 1 MG: 1 INJECTION, SOLUTION INTRAMUSCULAR; INTRAVENOUS; SUBCUTANEOUS at 22:18

## 2018-01-25 RX ADMIN — Medication 10 ML: at 20:10

## 2018-01-25 RX ADMIN — SODIUM CHLORIDE 100 ML: 900 INJECTION, SOLUTION INTRAVENOUS at 20:10

## 2018-01-25 RX ADMIN — DICYCLOMINE HYDROCHLORIDE 20 MG: 10 CAPSULE ORAL at 20:30

## 2018-01-25 RX ADMIN — SODIUM CHLORIDE 1000 ML: 900 INJECTION, SOLUTION INTRAVENOUS at 23:27

## 2018-01-25 RX ADMIN — IOPAMIDOL 100 ML: 755 INJECTION, SOLUTION INTRAVENOUS at 20:10

## 2018-01-25 RX ADMIN — SODIUM CHLORIDE 1000 ML: 900 INJECTION, SOLUTION INTRAVENOUS at 19:50

## 2018-01-25 RX ADMIN — HYDROMORPHONE HYDROCHLORIDE 0.5 MG: 1 INJECTION, SOLUTION INTRAMUSCULAR; INTRAVENOUS; SUBCUTANEOUS at 23:27

## 2018-01-25 RX ADMIN — ONDANSETRON 4 MG: 2 INJECTION INTRAMUSCULAR; INTRAVENOUS at 22:18
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
## 2018-01-26 VITALS
WEIGHT: 160 LBS | HEIGHT: 72 IN | HEART RATE: 55 BPM | DIASTOLIC BLOOD PRESSURE: 61 MMHG | OXYGEN SATURATION: 100 % | BODY MASS INDEX: 21.67 KG/M2 | SYSTOLIC BLOOD PRESSURE: 99 MMHG | TEMPERATURE: 98.2 F | RESPIRATION RATE: 16 BRPM

## 2018-01-26 PROBLEM — D62 ACUTE BLOOD LOSS ANEMIA: Status: RESOLVED | Noted: 2017-09-28 | Resolved: 2018-01-26

## 2018-01-26 PROBLEM — K50.90 EXACERBATION OF CROHN'S DISEASE (HCC): Status: ACTIVE | Noted: 2018-01-26

## 2018-01-26 PROBLEM — K92.2 GI BLEED: Status: RESOLVED | Noted: 2017-09-28 | Resolved: 2018-01-26

## 2018-01-26 LAB
AMPHET UR QL SCN: NEGATIVE
BARBITURATES UR QL SCN: NEGATIVE
BENZODIAZ UR QL: NEGATIVE
CANNABINOIDS UR QL SCN: POSITIVE
COCAINE UR QL SCN: NEGATIVE
DRUG SCRN COMMENT,DRGCM: ABNORMAL
FERRITIN SERPL-MCNC: 2 NG/ML (ref 26–388)
FOLATE SERPL-MCNC: 11.8 NG/ML (ref 5–21)
IRON SATN MFR SERPL: 4 % (ref 20–50)
IRON SERPL-MCNC: 13 UG/DL (ref 35–150)
IRON SERPL-MCNC: 14 UG/DL (ref 35–150)
MAGNESIUM SERPL-MCNC: 1.7 MG/DL (ref 1.6–2.4)
METHADONE UR QL: NEGATIVE
OPIATES UR QL: POSITIVE
PCP UR QL: NEGATIVE
PHOSPHATE SERPL-MCNC: 4.2 MG/DL (ref 2.6–4.7)
TIBC SERPL-MCNC: 329 UG/DL (ref 250–450)
TSH SERPL DL<=0.05 MIU/L-ACNC: 3.36 UIU/ML (ref 0.36–3.74)
VIT B12 SERPL-MCNC: 653 PG/ML (ref 211–911)

## 2018-01-26 PROCEDURE — 84100 ASSAY OF PHOSPHORUS: CPT | Performed by: INTERNAL MEDICINE

## 2018-01-26 PROCEDURE — 74011250636 HC RX REV CODE- 250/636: Performed by: INTERNAL MEDICINE

## 2018-01-26 PROCEDURE — 84443 ASSAY THYROID STIM HORMONE: CPT | Performed by: INTERNAL MEDICINE

## 2018-01-26 PROCEDURE — 83735 ASSAY OF MAGNESIUM: CPT | Performed by: INTERNAL MEDICINE

## 2018-01-26 PROCEDURE — 96365 THER/PROPH/DIAG IV INF INIT: CPT

## 2018-01-26 PROCEDURE — 83540 ASSAY OF IRON: CPT | Performed by: INTERNAL MEDICINE

## 2018-01-26 PROCEDURE — 82728 ASSAY OF FERRITIN: CPT | Performed by: INTERNAL MEDICINE

## 2018-01-26 PROCEDURE — 96374 THER/PROPH/DIAG INJ IV PUSH: CPT

## 2018-01-26 PROCEDURE — 99218 HC RM OBSERVATION: CPT

## 2018-01-26 PROCEDURE — 82746 ASSAY OF FOLIC ACID SERUM: CPT | Performed by: INTERNAL MEDICINE

## 2018-01-26 PROCEDURE — 96361 HYDRATE IV INFUSION ADD-ON: CPT

## 2018-01-26 PROCEDURE — 96372 THER/PROPH/DIAG INJ SC/IM: CPT

## 2018-01-26 PROCEDURE — 65270000029 HC RM PRIVATE

## 2018-01-26 PROCEDURE — 82607 VITAMIN B-12: CPT | Performed by: INTERNAL MEDICINE

## 2018-01-26 PROCEDURE — 74011000250 HC RX REV CODE- 250: Performed by: INTERNAL MEDICINE

## 2018-01-26 PROCEDURE — 36415 COLL VENOUS BLD VENIPUNCTURE: CPT | Performed by: INTERNAL MEDICINE

## 2018-01-26 PROCEDURE — 80307 DRUG TEST PRSMV CHEM ANLYZR: CPT | Performed by: HOSPITALIST

## 2018-01-26 RX ORDER — DICYCLOMINE HYDROCHLORIDE 10 MG/1
10 CAPSULE ORAL
Qty: 60 CAP | Refills: 0 | OUTPATIENT
Start: 2018-01-26

## 2018-01-26 RX ORDER — SODIUM CHLORIDE 0.9 % (FLUSH) 0.9 %
5-10 SYRINGE (ML) INJECTION AS NEEDED
Status: DISCONTINUED | OUTPATIENT
Start: 2018-01-26 | End: 2018-01-26 | Stop reason: HOSPADM

## 2018-01-26 RX ORDER — ENOXAPARIN SODIUM 100 MG/ML
40 INJECTION SUBCUTANEOUS EVERY 24 HOURS
Status: DISCONTINUED | OUTPATIENT
Start: 2018-01-26 | End: 2018-01-26 | Stop reason: HOSPADM

## 2018-01-26 RX ORDER — DOCUSATE SODIUM 100 MG/1
100 CAPSULE, LIQUID FILLED ORAL 2 TIMES DAILY
Status: DISCONTINUED | OUTPATIENT
Start: 2018-01-26 | End: 2018-01-26 | Stop reason: HOSPADM

## 2018-01-26 RX ORDER — IBUPROFEN 200 MG
1 TABLET ORAL EVERY 24 HOURS
Status: DISCONTINUED | OUTPATIENT
Start: 2018-01-26 | End: 2018-01-26 | Stop reason: HOSPADM

## 2018-01-26 RX ORDER — MESALAMINE 800 MG/1
800 TABLET, DELAYED RELEASE ORAL 3 TIMES DAILY
Status: DISCONTINUED | OUTPATIENT
Start: 2018-01-26 | End: 2018-01-26 | Stop reason: HOSPADM

## 2018-01-26 RX ORDER — HYDROMORPHONE HYDROCHLORIDE 1 MG/ML
1 INJECTION, SOLUTION INTRAMUSCULAR; INTRAVENOUS; SUBCUTANEOUS
Status: DISCONTINUED | OUTPATIENT
Start: 2018-01-26 | End: 2018-01-26 | Stop reason: HOSPADM

## 2018-01-26 RX ORDER — MESALAMINE 800 MG/1
800 TABLET, DELAYED RELEASE ORAL 3 TIMES DAILY
Qty: 90 TAB | Refills: 0 | OUTPATIENT
Start: 2018-01-26

## 2018-01-26 RX ORDER — ACETAMINOPHEN 325 MG/1
650 TABLET ORAL
Status: DISCONTINUED | OUTPATIENT
Start: 2018-01-26 | End: 2018-01-26 | Stop reason: HOSPADM

## 2018-01-26 RX ORDER — SODIUM CHLORIDE 9 MG/ML
100 INJECTION, SOLUTION INTRAVENOUS CONTINUOUS
Status: DISCONTINUED | OUTPATIENT
Start: 2018-01-26 | End: 2018-01-26 | Stop reason: HOSPADM

## 2018-01-26 RX ORDER — LANOLIN ALCOHOL/MO/W.PET/CERES
325 CREAM (GRAM) TOPICAL
Status: DISCONTINUED | OUTPATIENT
Start: 2018-01-26 | End: 2018-01-26 | Stop reason: HOSPADM

## 2018-01-26 RX ORDER — OXYCODONE HYDROCHLORIDE 5 MG/1
5-10 TABLET ORAL
Status: DISCONTINUED | OUTPATIENT
Start: 2018-01-26 | End: 2018-01-26 | Stop reason: HOSPADM

## 2018-01-26 RX ORDER — ONDANSETRON 2 MG/ML
4 INJECTION INTRAMUSCULAR; INTRAVENOUS
Status: DISCONTINUED | OUTPATIENT
Start: 2018-01-26 | End: 2018-01-26 | Stop reason: HOSPADM

## 2018-01-26 RX ORDER — SODIUM CHLORIDE 0.9 % (FLUSH) 0.9 %
5-10 SYRINGE (ML) INJECTION EVERY 8 HOURS
Status: DISCONTINUED | OUTPATIENT
Start: 2018-01-26 | End: 2018-01-26 | Stop reason: HOSPADM

## 2018-01-26 RX ADMIN — Medication 10 ML: at 07:23

## 2018-01-26 RX ADMIN — SODIUM CHLORIDE 100 ML/HR: 900 INJECTION, SOLUTION INTRAVENOUS at 07:22

## 2018-01-26 RX ADMIN — FOLIC ACID: 5 INJECTION, SOLUTION INTRAMUSCULAR; INTRAVENOUS; SUBCUTANEOUS at 09:04
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
## 2018-01-26 NOTE — ED NOTES
PROGRESS NOTE:  8:25 PM    Change of care has been made between Dr. Candance Muck and myself. Hand off complete. PROGRESS NOTE:  9:50 PM    I have spoken with the pt who informs that he has been experiencing moderate, diffuse mid abdominal pain since 6564-4760 this evening. Pt with hx of Crohn's disease and reports that he was given dilaudid prior to this finding in the past, which appeared to relieve him of his pain. Currently, the pt is prescribed Humira (1 pen every other week) as well as Mesalamine, however ran out of the Mesalamine 2-3 months ago. Pt with recent hx of move to [facility].
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
## 2018-01-26 NOTE — DISCHARGE INSTRUCTIONS
Abdominal Pain: After Your Visit to the Emergency Room  Your Care Instructions  Many abdominal problems can cause belly pain. Some are not serious and get better on their own in a few days. Other abdominal problems need more testing and emergency treatment. Your doctor may have recommended a follow-up visit in the next 8 to 12 hours. If you are not getting better, you may need more tests or treatment. Even though you have been released from the emergency room, you still need to watch for any problems. The doctor carefully checked you. But sometimes problems can develop later. If you have new symptoms, or if your symptoms do not get better, return to the emergency room or call your doctor right away. A visit to the emergency room is only one step in your treatment. Even if you feel better, you still need to do what your doctor recommends, such as going to all suggested follow-up appointments and taking medicines exactly as directed. This will help you recover and help prevent future problems. How can you care for yourself at home? · Rest until you feel better. · Drink plenty of fluids to prevent dehydration. Choose water and other caffeine-free clear liquids until you feel better. If you have kidney, heart, or liver disease and have to limit fluids, talk with your doctor before you increase the amount of fluids you drink. · Take your medicines exactly as directed. Call your doctor if you think you are having a problem with your medicine. · Do not drive after taking a prescription pain medicine. When should you call for help? Call 911 if:  · You passed out (lost consciousness). · You have sudden chest pain and shortness of breath, or you cough up blood. · You pass maroon or very bloody stools. · You vomit blood or what looks like coffee grounds. · You have new, severe belly pain. · You have other symptoms that you think are a medical emergency.   Return to the emergency room now if:  · You feel weak and lightheaded. · Your pain becomes focused in one area of your belly. · Your belly pain is worse after you cough or move. · You have a new or higher fever. Call your doctor today if:  · Your stools are black and tarlike or have streaks of blood. · You have new, unusual bleeding or discharge from the vagina. · You have blood in your urine, it hurts when you urinate, or you have to urinate more often than usual.  · Your belly pain has not improved after 1 day. Where can you learn more? Go to [employer]  Enter D163 in the search box to learn more about \"Abdominal Pain: After Your Visit to the Emergency Room. \"   © 6213-9545 Healthwise, Incorporated. Care instructions adapted under license by [employer] (which disclaims liability or warranty for this information). This care instruction is for use with your licensed healthcare professional. If you have questions about a medical condition or this instruction, always ask your healthcare professional. [place] [address] any warranty or liability for your use of this information.   Content Version: 9.3.85209; Last Revised: January 14, 2011

## 2018-01-26 NOTE — ED NOTES
Patient refusing bentyl, stating \"Ineed diluadid, that's the only thing that works for [facility]". MD notified, no orders received at this time.

## 2018-01-26 NOTE — ED NOTES
Report received from [id number] [employer], [facility].

## 2018-01-26 NOTE — DISCHARGE SUMMARY
Discharge Summary       PATIENT ID: Emelyn Trujillo  MRN: [id number]   YOB: 1975    DATE OF ADMISSION: 1/25/2018  6:22 PM    DATE OF DISCHARGE: 01/26/18  PRIMARY CARE PROVIDER: Dmitry Faustin NP       DISCHARGING PROVIDER: Elliot Polanco MD    To contact this individual call [phone] and ask the  to page. If unavailable ask to be transferred the Adult Hospitalist Department. CONSULTATIONS: IP CONSULT TO GASTROENTEROLOGY   Bolivar Farley MD      PROCEDURES/SURGERIES: * No surgery found *    IMAGING  Ct Abd Pelv W Cont    Result Date: 1/25/2018  IMPRESSION: 1. Inflammatory wall thickening of the right colon and transverse colon, as well as the terminal ileum, without associated obstruction or abscess. HISTORY OF PRESENT ILLNESS as per admitting MD:  This is a 36-year-old man with a past medical history significant for Crohn's disease who was in his usual state of health until the day of presentation at the emergency room when the patient developed abdominal pain. The pain is located at the center of the abdomen. The patient developed the pain 12 hours before coming to the emergency room. The patient thought that the pain is due to his Crohn's disease flareup. The pain is constant with no radiation, no known aggravating or relieving factors. Patient called his gastroenterologist and he was advised to go to the emergency room for further evaluation. The pain is not associated with nausea, vomiting. No history of fever, rigors, chills. When the patient arrived at the emergency room, CT scan of the abdomen and pelvis was obtained. The CT scan shows evidence of inflammation of the bowel. The patient was discussed with the gastroenterologist on call. Admission to the hospitalist service was advised and the patient was referred to the hospitalist service for that purpose.   It was also reported that the patient has history of narcotic abuse, requesting for intravenous pain medication in the emergency room. [id number] [facility] COURSE:   Crohn's flare  Improved with observation  No S/o bleeding  D/W Gi who recommends discharge on prior diet and follow up as outpatient, recommends  bentyl and Asacol and advised compliance with medications     Iron Def Anemia  Likely Chronic anemia from Crohn's disease  On Iron Supplementation     THC/Tobacco abuse  Advised cessation     Op follow up and general health maintainance counseling given       DISCHARGE DIAGNOSES / PLAN:      Patient Active Problem List   Diagnosis Code    Enterocolitis K52.9    Crohn disease ([facility] K50.90    Exacerbation of Crohn's disease ([facility] K50.90     FOLLOW UP APPOINTMENTS:    Follow-up Information     Follow up With Details Comments Contact Info    Dmitry Faustin NP   [address] [place] [id number] [phone]          DIET: Resume previous diet    ACTIVITY: Activity as tolerated    DISCHARGE MEDICATIONS:  Discharge Medication List as of 1/26/2018 12:53 PM      START taking these medications    Details   dicyclomine (BENTYL) 10 mg capsule Take 1 Cap by mouth four (4) times daily as needed. , Print, Disp-60 Cap, R-0         CONTINUE these medications which have CHANGED    Details   mesalamine DR (ASACOL HD) 800 mg DR tablet Take 1 Tab by mouth three (3) times daily. , Print, Disp-90 Tab, R-0         CONTINUE these medications which have NOT CHANGED    Details   adalimumab (HUMIRA PEN) 40 mg/0.8 mL injection pen 40 mg by SubCUTAneous route every fourteen (14) days. , Historical Med      ferrous sulfate (SLOW RELEASE IRON) 142 mg (45 mg iron) ER tablet Take 1 Tab by mouth two (2) times a day. Take with meals  Indications: IRON DEFICIENCY ANEMIA, Normal, Disp-60 Tab, R-0      CYANOCOBALAMIN, VITAMIN B-12, (VITAMIN B-12 PO) Take  by mouth daily. , Historical Med      cholecalciferol, vitamin D3, 4,000 unit cap Take 4,000 Units by mouth daily. , Historical Med      fish oil-omega-3 fatty acids (FISH OIL) 340-1,000 mg capsule Take 1 Cap by mouth daily. , Historical Med         STOP taking these medications       oxyCODONE IR (ROXICODONE) 10 mg tab immediate release tablet Comments:   Reason for Stopping:                 NOTIFY YOUR PHYSICIAN FOR ANY OF THE FOLLOWING:   Fever over 101 degrees for 24 hours. Chest pain, shortness of breath, fever, chills, nausea, vomiting, diarrhea, change in mentation, falling, weakness, bleeding. Severe pain or pain not relieved by medications. Or, any other signs or symptoms that you may have questions about. DISPOSITION:    Home With:   OT  PT  HH  RN       Long term SNF/Inpatient Rehab   X Independent/assisted living    Hospice    Other:       PATIENT CONDITION AT DISCHARGE:     Functional status    Poor     Deconditioned    X Independent      Cognition    X Lucid     Forgetful     Dementia      Catheters/lines (plus indication)    Agee     PICC     PEG    X None      Code status   X  Full code     DNR      PHYSICAL EXAMINATION AT DISCHARGE:  Gen:  No distress, alert  HEENT:  Normal cephalic atraumatic, extra-occular movements are intact. Neck:  Supple, No JVD  Lungs:  Clear bilaterally, no wheeze, no rales, normal effort  Heart:  Regular Rate and Rhythm, normal S1 and S2, no edema  Abdomen:  Soft, non tender, normal bowel sounds, no guarding.   Extremities:  Well perfused, no cyanosis or edema  Neurological:  Awake and alert, CN's are intact, normal strength throughout extremities  Skin:  No rashes or moles  Mental Status:  Normal thought process, does not appear anxious      CHRONIC MEDICAL DIAGNOSES:  Problem List as of 1/26/2018  Date Reviewed: 1/26/2018          Codes Class Noted - Resolved    * (Principal)Exacerbation of Crohn's disease ([facility] ICD-10-CM: K50.90  ICD-9-CM: 555.9  1/26/2018 - Present        Crohn disease ([facility] ICD-10-CM: K50.90  ICD-9-CM: 555.9  9/29/2017 - Present Acute blood loss anemia ICD-10-CM: D62  ICD-9-CM: 285.1  9/28/2017 - Present        GI bleed ICD-10-CM: K92.2  ICD-9-CM: 578.9  9/28/2017 - Present        Enterocolitis ICD-10-CM: K52.9  ICD-9-CM: 558.9  4/29/2017 - Present                Discussed with patient and family. Explained the importance of following up, Compliance with medications and recommendations on discharge,Side effect profile of medications were explained. Safety precautions at home and while taking pain medications also explained. All questions answered to the satisfaction of the patient/family. Discussed with consultant(s) who are agreeable to the discharge. Verbal and written instructions on discharge given.        Thank you Gabi De La O NP for taking care of your patient, Please call with any questions.       Greater than 37  minutes were spent with the patient on counseling and coordination of care    Signed:   Kim Guo MD  1/26/2018  12:17 PM
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
## 2018-01-26 NOTE — ED NOTES
RN to see patient. Patient upset about having a liquid diet. RN explained the importance of diet order, patient continues to be unhappy stating that he will have someone sneak in food for him. Patient refusing all morning medications.

## 2022-06-26 NOTE — ED TRIAGE NOTES
Pt arrives via EMS from home c/o generalized sharp, cramping abd pain. Pt reports he is worried he is having a Crohn's flare up. Pt denies any other symptoms. Dr. Jessica Kurtz follows pt.
Never

## (undated) DEVICE — KENDALL RADIOLUCENT FOAM MONITORING ELECTRODE -RECTANGULAR SHAPE: Brand: KENDALL

## (undated) DEVICE — CLIP HEMO ENDOSCP 235CM BX/10 -- RESOLUTION 360

## (undated) DEVICE — SYRINGE MED 20ML STD CLR PLAS LUERLOCK TIP N CTRL DISP

## (undated) DEVICE — CONNECTOR TBNG AUX H2O JET DISP FOR OLY 160/180 SER

## (undated) DEVICE — NEONATAL-ADULT SPO2 SENSOR: Brand: NELLCOR

## (undated) DEVICE — QUILTED PREMIUM COMFORT UNDERPAD,EXTRA HEAVY: Brand: WINGS

## (undated) DEVICE — SET EXTN TBNG L BOR 4 W STPCOCK ST 32IN PRIMING VOL 6ML

## (undated) DEVICE — KIT IV STRT W CHLORAPREP PD 1ML

## (undated) DEVICE — BAG BELONG PT PERS CLEAR HANDL

## (undated) DEVICE — 1200 GUARD II KIT W/5MM TUBE W/O VAC TUBE: Brand: GUARDIAN

## (undated) DEVICE — CONTAINER SPEC 20 ML LID NEUT BUFF FORMALIN 10 % POLYPR STS

## (undated) DEVICE — CATH IV AUTOGRD BC BLU 22GA 25 -- INSYTE

## (undated) DEVICE — Z DISCONTINUED USE 2751540 TUBING IRRIG L10IN DISP PMP ENDOGATOR

## (undated) DEVICE — BW-412T DISP COMBO CLEANING BRUSH: Brand: SINGLE USE COMBINATION CLEANING BRUSH

## (undated) DEVICE — SOLIDIFIER FLUID 3000 CC ABSORB

## (undated) DEVICE — FORCEPS BX L240CM JAW DIA2.8MM L CAP W/ NDL MIC MESH TOOTH

## (undated) DEVICE — Device

## (undated) DEVICE — NEEDLE HYPO 18GA L1.5IN PNK S STL HUB POLYPR SHLD REG BVL

## (undated) DEVICE — AIRLIFE™ U/CONNECT-IT OXYGEN TUBING 7 FEET (2.1 M) CRUSH-RESISTANT OXYGEN TUBING, VINYL TIPPED: Brand: AIRLIFE™

## (undated) DEVICE — BAG SPEC BIOHZD LF 2MIL 6X10IN -- CONVERT TO ITEM 357326

## (undated) DEVICE — ENDO CARRY-ON PROCEDURE KIT INCLUDES ENZYMATIC SPONGE, GAUZE, BIOHAZARD LABEL, TRAY, LUBRICANT, DIRTY SCOPE LABEL, WATER LABEL, TRAY, DRAWSTRING PAD, AND DEFENDO 4-PIECE KIT.: Brand: ENDO CARRY-ON PROCEDURE KIT

## (undated) DEVICE — CANN NASAL O2 CAPNOGRAPHY AD -- FILTERLINE

## (undated) DEVICE — SET ADMIN 16ML TBNG L100IN 2 Y INJ SITE IV PIGGY BK DISP